# Patient Record
Sex: FEMALE | Race: WHITE | NOT HISPANIC OR LATINO | Employment: FULL TIME | URBAN - METROPOLITAN AREA
[De-identification: names, ages, dates, MRNs, and addresses within clinical notes are randomized per-mention and may not be internally consistent; named-entity substitution may affect disease eponyms.]

---

## 2017-02-08 ENCOUNTER — COMMUNICATION - HEALTHEAST (OUTPATIENT)
Dept: FAMILY MEDICINE | Facility: CLINIC | Age: 22
End: 2017-02-08

## 2017-02-08 DIAGNOSIS — L70.9 ACNE: ICD-10-CM

## 2017-02-14 ENCOUNTER — COMMUNICATION - HEALTHEAST (OUTPATIENT)
Dept: FAMILY MEDICINE | Facility: CLINIC | Age: 22
End: 2017-02-14

## 2017-02-14 DIAGNOSIS — F90.9 ATTENTION DEFICIT HYPERACTIVITY DISORDER (ADHD), UNSPECIFIED ADHD TYPE: ICD-10-CM

## 2017-05-14 ENCOUNTER — OFFICE VISIT - HEALTHEAST (OUTPATIENT)
Dept: FAMILY MEDICINE | Facility: CLINIC | Age: 22
End: 2017-05-14

## 2017-05-14 DIAGNOSIS — H61.22 IMPACTED CERUMEN OF LEFT EAR: ICD-10-CM

## 2017-05-18 ENCOUNTER — COMMUNICATION - HEALTHEAST (OUTPATIENT)
Dept: FAMILY MEDICINE | Facility: CLINIC | Age: 22
End: 2017-05-18

## 2017-05-18 DIAGNOSIS — F90.9 ATTENTION DEFICIT HYPERACTIVITY DISORDER (ADHD), UNSPECIFIED ADHD TYPE: ICD-10-CM

## 2017-05-23 ENCOUNTER — COMMUNICATION - HEALTHEAST (OUTPATIENT)
Dept: FAMILY MEDICINE | Facility: CLINIC | Age: 22
End: 2017-05-23

## 2017-06-04 ENCOUNTER — COMMUNICATION - HEALTHEAST (OUTPATIENT)
Dept: FAMILY MEDICINE | Facility: CLINIC | Age: 22
End: 2017-06-04

## 2017-06-04 DIAGNOSIS — K21.00 REFLUX ESOPHAGITIS: ICD-10-CM

## 2017-06-05 ENCOUNTER — RECORDS - HEALTHEAST (OUTPATIENT)
Dept: ADMINISTRATIVE | Facility: OTHER | Age: 22
End: 2017-06-05

## 2017-06-05 ENCOUNTER — OFFICE VISIT - HEALTHEAST (OUTPATIENT)
Dept: FAMILY MEDICINE | Facility: CLINIC | Age: 22
End: 2017-06-05

## 2017-06-05 DIAGNOSIS — F90.9 ATTENTION DEFICIT HYPERACTIVITY DISORDER (ADHD), UNSPECIFIED ADHD TYPE: ICD-10-CM

## 2017-06-05 ASSESSMENT — MIFFLIN-ST. JEOR: SCORE: 1411.32

## 2017-06-12 ENCOUNTER — AMBULATORY - HEALTHEAST (OUTPATIENT)
Dept: FAMILY MEDICINE | Facility: CLINIC | Age: 22
End: 2017-06-12

## 2017-08-17 ENCOUNTER — COMMUNICATION - HEALTHEAST (OUTPATIENT)
Dept: FAMILY MEDICINE | Facility: CLINIC | Age: 22
End: 2017-08-17

## 2017-08-17 DIAGNOSIS — F90.9 ATTENTION DEFICIT HYPERACTIVITY DISORDER (ADHD), UNSPECIFIED ADHD TYPE: ICD-10-CM

## 2017-11-29 ENCOUNTER — COMMUNICATION - HEALTHEAST (OUTPATIENT)
Dept: FAMILY MEDICINE | Facility: CLINIC | Age: 22
End: 2017-11-29

## 2017-11-29 DIAGNOSIS — F90.9 ATTENTION DEFICIT HYPERACTIVITY DISORDER (ADHD), UNSPECIFIED ADHD TYPE: ICD-10-CM

## 2017-12-18 ENCOUNTER — OFFICE VISIT - HEALTHEAST (OUTPATIENT)
Dept: FAMILY MEDICINE | Facility: CLINIC | Age: 22
End: 2017-12-18

## 2017-12-18 DIAGNOSIS — G47.00 INSOMNIA: ICD-10-CM

## 2017-12-18 DIAGNOSIS — F98.8 ATTENTION DEFICIT DISORDER: ICD-10-CM

## 2017-12-18 ASSESSMENT — MIFFLIN-ST. JEOR: SCORE: 1423.11

## 2017-12-27 ENCOUNTER — COMMUNICATION - HEALTHEAST (OUTPATIENT)
Dept: FAMILY MEDICINE | Facility: CLINIC | Age: 22
End: 2017-12-27

## 2017-12-27 DIAGNOSIS — F90.9 ATTENTION DEFICIT HYPERACTIVITY DISORDER (ADHD), UNSPECIFIED ADHD TYPE: ICD-10-CM

## 2018-01-14 ENCOUNTER — COMMUNICATION - HEALTHEAST (OUTPATIENT)
Dept: FAMILY MEDICINE | Facility: CLINIC | Age: 23
End: 2018-01-14

## 2018-01-14 DIAGNOSIS — L70.9 ACNE: ICD-10-CM

## 2018-02-07 ENCOUNTER — COMMUNICATION - HEALTHEAST (OUTPATIENT)
Dept: FAMILY MEDICINE | Facility: CLINIC | Age: 23
End: 2018-02-07

## 2018-02-07 ENCOUNTER — AMBULATORY - HEALTHEAST (OUTPATIENT)
Dept: FAMILY MEDICINE | Facility: CLINIC | Age: 23
End: 2018-02-07

## 2018-02-07 DIAGNOSIS — F90.9 ATTENTION DEFICIT HYPERACTIVITY DISORDER (ADHD), UNSPECIFIED ADHD TYPE: ICD-10-CM

## 2018-03-04 ENCOUNTER — COMMUNICATION - HEALTHEAST (OUTPATIENT)
Dept: FAMILY MEDICINE | Facility: CLINIC | Age: 23
End: 2018-03-04

## 2018-03-04 DIAGNOSIS — K21.00 REFLUX ESOPHAGITIS: ICD-10-CM

## 2018-05-07 ENCOUNTER — OFFICE VISIT - HEALTHEAST (OUTPATIENT)
Dept: FAMILY MEDICINE | Facility: CLINIC | Age: 23
End: 2018-05-07

## 2018-05-07 DIAGNOSIS — F41.1 ANXIETY STATE: ICD-10-CM

## 2018-05-07 DIAGNOSIS — G80.8 CONGENITAL DIPLEGIA (H): ICD-10-CM

## 2018-05-07 DIAGNOSIS — Z00.00 ROUTINE GENERAL MEDICAL EXAMINATION AT A HEALTH CARE FACILITY: ICD-10-CM

## 2018-05-07 DIAGNOSIS — F90.9 ATTENTION DEFICIT HYPERACTIVITY DISORDER (ADHD), UNSPECIFIED ADHD TYPE: ICD-10-CM

## 2018-05-07 ASSESSMENT — MIFFLIN-ST. JEOR: SCORE: 1415.86

## 2018-05-08 ENCOUNTER — COMMUNICATION - HEALTHEAST (OUTPATIENT)
Dept: FAMILY MEDICINE | Facility: CLINIC | Age: 23
End: 2018-05-08

## 2018-05-08 DIAGNOSIS — F90.9 ATTENTION DEFICIT HYPERACTIVITY DISORDER (ADHD), UNSPECIFIED ADHD TYPE: ICD-10-CM

## 2018-05-21 ENCOUNTER — COMMUNICATION - HEALTHEAST (OUTPATIENT)
Dept: FAMILY MEDICINE | Facility: CLINIC | Age: 23
End: 2018-05-21

## 2018-06-03 ENCOUNTER — COMMUNICATION - HEALTHEAST (OUTPATIENT)
Dept: FAMILY MEDICINE | Facility: CLINIC | Age: 23
End: 2018-06-03

## 2018-06-03 DIAGNOSIS — K21.00 REFLUX ESOPHAGITIS: ICD-10-CM

## 2018-08-01 ENCOUNTER — COMMUNICATION - HEALTHEAST (OUTPATIENT)
Dept: FAMILY MEDICINE | Facility: CLINIC | Age: 23
End: 2018-08-01

## 2018-08-01 DIAGNOSIS — F90.9 ATTENTION DEFICIT HYPERACTIVITY DISORDER (ADHD), UNSPECIFIED ADHD TYPE: ICD-10-CM

## 2018-08-06 ENCOUNTER — COMMUNICATION - HEALTHEAST (OUTPATIENT)
Dept: SCHEDULING | Facility: CLINIC | Age: 23
End: 2018-08-06

## 2018-08-13 ENCOUNTER — OFFICE VISIT - HEALTHEAST (OUTPATIENT)
Dept: FAMILY MEDICINE | Facility: CLINIC | Age: 23
End: 2018-08-13

## 2018-08-13 DIAGNOSIS — Z79.899 CONTROLLED SUBSTANCE AGREEMENT SIGNED: ICD-10-CM

## 2018-08-13 DIAGNOSIS — G80.8 CONGENITAL DIPLEGIA (H): ICD-10-CM

## 2018-08-13 DIAGNOSIS — F98.8 ATTENTION DEFICIT DISORDER: ICD-10-CM

## 2018-08-13 ASSESSMENT — MIFFLIN-ST. JEOR: SCORE: 1438.54

## 2018-08-15 ENCOUNTER — RECORDS - HEALTHEAST (OUTPATIENT)
Dept: ADMINISTRATIVE | Facility: OTHER | Age: 23
End: 2018-08-15

## 2018-10-25 ENCOUNTER — OFFICE VISIT - HEALTHEAST (OUTPATIENT)
Dept: FAMILY MEDICINE | Facility: CLINIC | Age: 23
End: 2018-10-25

## 2018-10-25 DIAGNOSIS — G80.8 CONGENITAL DIPLEGIA (H): ICD-10-CM

## 2018-12-18 ENCOUNTER — COMMUNICATION - HEALTHEAST (OUTPATIENT)
Dept: FAMILY MEDICINE | Facility: CLINIC | Age: 23
End: 2018-12-18

## 2018-12-18 DIAGNOSIS — L70.9 ACNE: ICD-10-CM

## 2019-01-09 ENCOUNTER — OFFICE VISIT - HEALTHEAST (OUTPATIENT)
Dept: FAMILY MEDICINE | Facility: CLINIC | Age: 24
End: 2019-01-09

## 2019-01-09 DIAGNOSIS — Z00.00 ROUTINE GENERAL MEDICAL EXAMINATION AT A HEALTH CARE FACILITY: ICD-10-CM

## 2019-01-09 DIAGNOSIS — F90.9 ATTENTION DEFICIT HYPERACTIVITY DISORDER (ADHD), UNSPECIFIED ADHD TYPE: ICD-10-CM

## 2019-01-09 DIAGNOSIS — F98.8 ATTENTION DEFICIT DISORDER, UNSPECIFIED HYPERACTIVITY PRESENCE: ICD-10-CM

## 2019-01-09 DIAGNOSIS — G80.8 CONGENITAL DIPLEGIA (H): ICD-10-CM

## 2019-01-09 ASSESSMENT — MIFFLIN-ST. JEOR: SCORE: 1438.54

## 2019-01-23 ENCOUNTER — COMMUNICATION - HEALTHEAST (OUTPATIENT)
Dept: FAMILY MEDICINE | Facility: CLINIC | Age: 24
End: 2019-01-23

## 2019-03-06 ENCOUNTER — COMMUNICATION - HEALTHEAST (OUTPATIENT)
Dept: FAMILY MEDICINE | Facility: CLINIC | Age: 24
End: 2019-03-06

## 2019-05-14 ENCOUNTER — OFFICE VISIT - HEALTHEAST (OUTPATIENT)
Dept: FAMILY MEDICINE | Facility: CLINIC | Age: 24
End: 2019-05-14

## 2019-05-14 ENCOUNTER — AMBULATORY - HEALTHEAST (OUTPATIENT)
Dept: FAMILY MEDICINE | Facility: CLINIC | Age: 24
End: 2019-05-14

## 2019-05-14 DIAGNOSIS — Z79.899 CONTROLLED SUBSTANCE AGREEMENT SIGNED: ICD-10-CM

## 2019-05-14 DIAGNOSIS — F98.8 ATTENTION DEFICIT DISORDER, UNSPECIFIED HYPERACTIVITY PRESENCE: ICD-10-CM

## 2019-05-14 DIAGNOSIS — F90.9 ATTENTION DEFICIT HYPERACTIVITY DISORDER (ADHD), UNSPECIFIED ADHD TYPE: ICD-10-CM

## 2019-05-14 LAB
AMPHETAMINES UR QL SCN: NORMAL
BARBITURATES UR QL: NORMAL
BENZODIAZ UR QL: NORMAL
CANNABINOIDS UR QL SCN: NORMAL
COCAINE UR QL: NORMAL
CREAT UR-MCNC: 89.1 MG/DL
METHADONE UR QL SCN: NORMAL
OPIATES UR QL SCN: NORMAL
OXYCODONE UR QL: NORMAL
PCP UR QL SCN: NORMAL

## 2019-05-14 ASSESSMENT — MIFFLIN-ST. JEOR: SCORE: 1415.86

## 2019-05-23 ENCOUNTER — COMMUNICATION - HEALTHEAST (OUTPATIENT)
Dept: FAMILY MEDICINE | Facility: CLINIC | Age: 24
End: 2019-05-23

## 2019-05-23 DIAGNOSIS — R52 BODY ACHES: ICD-10-CM

## 2019-06-02 ENCOUNTER — COMMUNICATION - HEALTHEAST (OUTPATIENT)
Dept: FAMILY MEDICINE | Facility: CLINIC | Age: 24
End: 2019-06-02

## 2019-06-02 DIAGNOSIS — K21.00 REFLUX ESOPHAGITIS: ICD-10-CM

## 2019-07-27 ENCOUNTER — COMMUNICATION - HEALTHEAST (OUTPATIENT)
Dept: FAMILY MEDICINE | Facility: CLINIC | Age: 24
End: 2019-07-27

## 2019-07-27 DIAGNOSIS — F90.9 ATTENTION DEFICIT HYPERACTIVITY DISORDER (ADHD), UNSPECIFIED ADHD TYPE: ICD-10-CM

## 2019-08-27 ENCOUNTER — COMMUNICATION - HEALTHEAST (OUTPATIENT)
Dept: FAMILY MEDICINE | Facility: CLINIC | Age: 24
End: 2019-08-27

## 2019-08-27 DIAGNOSIS — L70.9 ACNE: ICD-10-CM

## 2019-09-24 ENCOUNTER — COMMUNICATION - HEALTHEAST (OUTPATIENT)
Dept: FAMILY MEDICINE | Facility: CLINIC | Age: 24
End: 2019-09-24

## 2019-09-24 DIAGNOSIS — R52 BODY ACHES: ICD-10-CM

## 2019-10-11 ENCOUNTER — RECORDS - HEALTHEAST (OUTPATIENT)
Dept: ADMINISTRATIVE | Facility: OTHER | Age: 24
End: 2019-10-11

## 2019-10-12 ENCOUNTER — COMMUNICATION - HEALTHEAST (OUTPATIENT)
Dept: FAMILY MEDICINE | Facility: CLINIC | Age: 24
End: 2019-10-12

## 2019-10-12 DIAGNOSIS — F90.9 ATTENTION DEFICIT HYPERACTIVITY DISORDER (ADHD), UNSPECIFIED ADHD TYPE: ICD-10-CM

## 2019-10-25 ENCOUNTER — COMMUNICATION - HEALTHEAST (OUTPATIENT)
Dept: FAMILY MEDICINE | Facility: CLINIC | Age: 24
End: 2019-10-25

## 2019-11-01 ENCOUNTER — OFFICE VISIT - HEALTHEAST (OUTPATIENT)
Dept: FAMILY MEDICINE | Facility: CLINIC | Age: 24
End: 2019-11-01

## 2019-11-01 DIAGNOSIS — G80.8 CONGENITAL DIPLEGIA (H): ICD-10-CM

## 2019-11-01 DIAGNOSIS — F90.9 ATTENTION DEFICIT HYPERACTIVITY DISORDER (ADHD), UNSPECIFIED ADHD TYPE: ICD-10-CM

## 2019-11-01 DIAGNOSIS — Z23 NEED FOR VACCINATION: ICD-10-CM

## 2019-12-02 ENCOUNTER — RECORDS - HEALTHEAST (OUTPATIENT)
Dept: ADMINISTRATIVE | Facility: OTHER | Age: 24
End: 2019-12-02

## 2019-12-10 ENCOUNTER — COMMUNICATION - HEALTHEAST (OUTPATIENT)
Dept: FAMILY MEDICINE | Facility: CLINIC | Age: 24
End: 2019-12-10

## 2019-12-10 DIAGNOSIS — F90.9 ATTENTION DEFICIT HYPERACTIVITY DISORDER (ADHD), UNSPECIFIED ADHD TYPE: ICD-10-CM

## 2020-01-15 ENCOUNTER — COMMUNICATION - HEALTHEAST (OUTPATIENT)
Dept: FAMILY MEDICINE | Facility: CLINIC | Age: 25
End: 2020-01-15

## 2020-01-15 DIAGNOSIS — F90.9 ATTENTION DEFICIT HYPERACTIVITY DISORDER (ADHD), UNSPECIFIED ADHD TYPE: ICD-10-CM

## 2020-02-11 ENCOUNTER — COMMUNICATION - HEALTHEAST (OUTPATIENT)
Dept: FAMILY MEDICINE | Facility: CLINIC | Age: 25
End: 2020-02-11

## 2020-02-11 DIAGNOSIS — L70.9 ACNE: ICD-10-CM

## 2020-03-10 ENCOUNTER — OFFICE VISIT - HEALTHEAST (OUTPATIENT)
Dept: FAMILY MEDICINE | Facility: CLINIC | Age: 25
End: 2020-03-10

## 2020-03-10 DIAGNOSIS — Z79.899 CONTROLLED SUBSTANCE AGREEMENT SIGNED: ICD-10-CM

## 2020-03-10 DIAGNOSIS — J30.9 ALLERGIC RHINITIS, UNSPECIFIED SEASONALITY, UNSPECIFIED TRIGGER: ICD-10-CM

## 2020-03-10 DIAGNOSIS — G80.8 CONGENITAL DIPLEGIA (H): ICD-10-CM

## 2020-03-10 DIAGNOSIS — R52 BODY ACHES: ICD-10-CM

## 2020-03-10 DIAGNOSIS — F90.9 ATTENTION DEFICIT HYPERACTIVITY DISORDER (ADHD), UNSPECIFIED ADHD TYPE: ICD-10-CM

## 2020-03-10 DIAGNOSIS — R51.9 NONINTRACTABLE HEADACHE, UNSPECIFIED CHRONICITY PATTERN, UNSPECIFIED HEADACHE TYPE: ICD-10-CM

## 2020-03-10 DIAGNOSIS — R19.4 CHANGE IN BOWEL HABIT: ICD-10-CM

## 2020-03-10 LAB
ALBUMIN SERPL-MCNC: 4.4 G/DL (ref 3.5–5)
ALP SERPL-CCNC: 66 U/L (ref 45–120)
ALT SERPL W P-5'-P-CCNC: 25 U/L (ref 0–45)
AMPHETAMINES UR QL SCN: NORMAL
ANION GAP SERPL CALCULATED.3IONS-SCNC: 11 MMOL/L (ref 5–18)
AST SERPL W P-5'-P-CCNC: 18 U/L (ref 0–40)
BARBITURATES UR QL: NORMAL
BENZODIAZ UR QL: NORMAL
BILIRUB SERPL-MCNC: 0.6 MG/DL (ref 0–1)
BUN SERPL-MCNC: 8 MG/DL (ref 8–22)
CALCIUM SERPL-MCNC: 9.9 MG/DL (ref 8.5–10.5)
CANNABINOIDS UR QL SCN: NORMAL
CHLORIDE BLD-SCNC: 104 MMOL/L (ref 98–107)
CO2 SERPL-SCNC: 23 MMOL/L (ref 22–31)
COCAINE UR QL: NORMAL
CREAT SERPL-MCNC: 0.72 MG/DL (ref 0.6–1.1)
CREAT UR-MCNC: 69.1 MG/DL
ERYTHROCYTE [DISTWIDTH] IN BLOOD BY AUTOMATED COUNT: 12 % (ref 11–14.5)
ERYTHROCYTE [SEDIMENTATION RATE] IN BLOOD BY WESTERGREN METHOD: 5 MM/HR (ref 0–20)
GFR SERPL CREATININE-BSD FRML MDRD: >60 ML/MIN/1.73M2
GLUCOSE BLD-MCNC: 83 MG/DL (ref 70–125)
HCT VFR BLD AUTO: 41.5 % (ref 35–47)
HGB BLD-MCNC: 13.8 G/DL (ref 12–16)
MCH RBC QN AUTO: 30 PG (ref 27–34)
MCHC RBC AUTO-ENTMCNC: 33.4 G/DL (ref 32–36)
MCV RBC AUTO: 90 FL (ref 80–100)
METHADONE UR QL SCN: NORMAL
OPIATES UR QL SCN: NORMAL
OXYCODONE UR QL: NORMAL
PCP UR QL SCN: NORMAL
PLATELET # BLD AUTO: 302 THOU/UL (ref 140–440)
PMV BLD AUTO: 7.2 FL (ref 7–10)
POTASSIUM BLD-SCNC: 4.7 MMOL/L (ref 3.5–5)
PROT SERPL-MCNC: 7.3 G/DL (ref 6–8)
RBC # BLD AUTO: 4.61 MILL/UL (ref 3.8–5.4)
SODIUM SERPL-SCNC: 138 MMOL/L (ref 136–145)
WBC: 4.8 THOU/UL (ref 4–11)

## 2020-03-10 ASSESSMENT — MIFFLIN-ST. JEOR: SCORE: 1419.48

## 2020-04-07 ENCOUNTER — COMMUNICATION - HEALTHEAST (OUTPATIENT)
Dept: SCHEDULING | Facility: CLINIC | Age: 25
End: 2020-04-07

## 2020-04-07 ENCOUNTER — OFFICE VISIT - HEALTHEAST (OUTPATIENT)
Dept: FAMILY MEDICINE | Facility: CLINIC | Age: 25
End: 2020-04-07

## 2020-04-07 DIAGNOSIS — J34.89 SINUS PAIN: ICD-10-CM

## 2020-04-07 DIAGNOSIS — H92.03 EAR PAIN, BILATERAL: ICD-10-CM

## 2020-04-11 ENCOUNTER — COMMUNICATION - HEALTHEAST (OUTPATIENT)
Dept: FAMILY MEDICINE | Facility: CLINIC | Age: 25
End: 2020-04-11

## 2020-04-11 DIAGNOSIS — F90.9 ATTENTION DEFICIT HYPERACTIVITY DISORDER (ADHD), UNSPECIFIED ADHD TYPE: ICD-10-CM

## 2020-05-15 ENCOUNTER — COMMUNICATION - HEALTHEAST (OUTPATIENT)
Dept: FAMILY MEDICINE | Facility: CLINIC | Age: 25
End: 2020-05-15

## 2020-05-15 ENCOUNTER — OFFICE VISIT - HEALTHEAST (OUTPATIENT)
Dept: FAMILY MEDICINE | Facility: CLINIC | Age: 25
End: 2020-05-15

## 2020-05-15 DIAGNOSIS — F90.9 ATTENTION DEFICIT HYPERACTIVITY DISORDER (ADHD), UNSPECIFIED ADHD TYPE: ICD-10-CM

## 2020-05-15 DIAGNOSIS — M70.61 TROCHANTERIC BURSITIS OF BOTH HIPS: ICD-10-CM

## 2020-05-15 DIAGNOSIS — M70.62 TROCHANTERIC BURSITIS OF BOTH HIPS: ICD-10-CM

## 2020-05-15 DIAGNOSIS — G80.8 CONGENITAL DIPLEGIA (H): ICD-10-CM

## 2020-05-21 ENCOUNTER — OFFICE VISIT - HEALTHEAST (OUTPATIENT)
Dept: PHYSICAL THERAPY | Facility: REHABILITATION | Age: 25
End: 2020-05-21

## 2020-05-21 DIAGNOSIS — M70.61 TROCHANTERIC BURSITIS OF BOTH HIPS: ICD-10-CM

## 2020-05-21 DIAGNOSIS — M70.62 TROCHANTERIC BURSITIS OF BOTH HIPS: ICD-10-CM

## 2020-05-21 DIAGNOSIS — M54.50 CHRONIC BILATERAL LOW BACK PAIN WITHOUT SCIATICA: ICD-10-CM

## 2020-05-21 DIAGNOSIS — G89.29 CHRONIC BILATERAL LOW BACK PAIN WITHOUT SCIATICA: ICD-10-CM

## 2020-05-26 ENCOUNTER — OFFICE VISIT - HEALTHEAST (OUTPATIENT)
Dept: PHYSICAL THERAPY | Facility: REHABILITATION | Age: 25
End: 2020-05-26

## 2020-05-26 DIAGNOSIS — M70.62 TROCHANTERIC BURSITIS OF BOTH HIPS: ICD-10-CM

## 2020-05-26 DIAGNOSIS — G89.29 CHRONIC BILATERAL LOW BACK PAIN WITHOUT SCIATICA: ICD-10-CM

## 2020-05-26 DIAGNOSIS — M70.61 TROCHANTERIC BURSITIS OF BOTH HIPS: ICD-10-CM

## 2020-05-26 DIAGNOSIS — M54.50 CHRONIC BILATERAL LOW BACK PAIN WITHOUT SCIATICA: ICD-10-CM

## 2020-05-27 ENCOUNTER — COMMUNICATION - HEALTHEAST (OUTPATIENT)
Dept: FAMILY MEDICINE | Facility: CLINIC | Age: 25
End: 2020-05-27

## 2020-05-27 DIAGNOSIS — K21.00 REFLUX ESOPHAGITIS: ICD-10-CM

## 2020-06-10 ENCOUNTER — OFFICE VISIT - HEALTHEAST (OUTPATIENT)
Dept: PHYSICAL THERAPY | Facility: REHABILITATION | Age: 25
End: 2020-06-10

## 2020-06-10 DIAGNOSIS — M70.61 TROCHANTERIC BURSITIS OF BOTH HIPS: ICD-10-CM

## 2020-06-10 DIAGNOSIS — G89.29 CHRONIC BILATERAL LOW BACK PAIN WITHOUT SCIATICA: ICD-10-CM

## 2020-06-10 DIAGNOSIS — M54.50 CHRONIC BILATERAL LOW BACK PAIN WITHOUT SCIATICA: ICD-10-CM

## 2020-06-10 DIAGNOSIS — M70.62 TROCHANTERIC BURSITIS OF BOTH HIPS: ICD-10-CM

## 2020-06-18 ENCOUNTER — COMMUNICATION - HEALTHEAST (OUTPATIENT)
Dept: FAMILY MEDICINE | Facility: CLINIC | Age: 25
End: 2020-06-18

## 2020-06-18 DIAGNOSIS — F90.9 ATTENTION DEFICIT HYPERACTIVITY DISORDER (ADHD), UNSPECIFIED ADHD TYPE: ICD-10-CM

## 2020-06-19 ENCOUNTER — OFFICE VISIT - HEALTHEAST (OUTPATIENT)
Dept: PHYSICAL THERAPY | Facility: REHABILITATION | Age: 25
End: 2020-06-19

## 2020-06-19 DIAGNOSIS — M70.62 TROCHANTERIC BURSITIS OF BOTH HIPS: ICD-10-CM

## 2020-06-19 DIAGNOSIS — G89.29 CHRONIC BILATERAL LOW BACK PAIN WITHOUT SCIATICA: ICD-10-CM

## 2020-06-19 DIAGNOSIS — M54.50 CHRONIC BILATERAL LOW BACK PAIN WITHOUT SCIATICA: ICD-10-CM

## 2020-06-19 DIAGNOSIS — M70.61 TROCHANTERIC BURSITIS OF BOTH HIPS: ICD-10-CM

## 2020-06-24 ENCOUNTER — OFFICE VISIT - HEALTHEAST (OUTPATIENT)
Dept: PHYSICAL THERAPY | Facility: REHABILITATION | Age: 25
End: 2020-06-24

## 2020-06-24 DIAGNOSIS — G89.29 CHRONIC BILATERAL LOW BACK PAIN WITHOUT SCIATICA: ICD-10-CM

## 2020-06-24 DIAGNOSIS — M70.61 TROCHANTERIC BURSITIS OF BOTH HIPS: ICD-10-CM

## 2020-06-24 DIAGNOSIS — M70.62 TROCHANTERIC BURSITIS OF BOTH HIPS: ICD-10-CM

## 2020-06-24 DIAGNOSIS — M54.50 CHRONIC BILATERAL LOW BACK PAIN WITHOUT SCIATICA: ICD-10-CM

## 2020-06-30 ENCOUNTER — OFFICE VISIT - HEALTHEAST (OUTPATIENT)
Dept: FAMILY MEDICINE | Facility: CLINIC | Age: 25
End: 2020-06-30

## 2020-06-30 DIAGNOSIS — M70.62 TROCHANTERIC BURSITIS OF BOTH HIPS: ICD-10-CM

## 2020-06-30 DIAGNOSIS — M70.61 TROCHANTERIC BURSITIS OF BOTH HIPS: ICD-10-CM

## 2020-06-30 DIAGNOSIS — K21.00 REFLUX ESOPHAGITIS: ICD-10-CM

## 2020-07-01 ENCOUNTER — OFFICE VISIT - HEALTHEAST (OUTPATIENT)
Dept: PHYSICAL THERAPY | Facility: REHABILITATION | Age: 25
End: 2020-07-01

## 2020-07-01 DIAGNOSIS — M70.62 TROCHANTERIC BURSITIS OF BOTH HIPS: ICD-10-CM

## 2020-07-01 DIAGNOSIS — G89.29 CHRONIC BILATERAL LOW BACK PAIN WITHOUT SCIATICA: ICD-10-CM

## 2020-07-01 DIAGNOSIS — M54.50 CHRONIC BILATERAL LOW BACK PAIN WITHOUT SCIATICA: ICD-10-CM

## 2020-07-01 DIAGNOSIS — M70.61 TROCHANTERIC BURSITIS OF BOTH HIPS: ICD-10-CM

## 2020-07-03 ENCOUNTER — AMBULATORY - HEALTHEAST (OUTPATIENT)
Dept: FAMILY MEDICINE | Facility: CLINIC | Age: 25
End: 2020-07-03

## 2020-07-03 DIAGNOSIS — M70.61 TROCHANTERIC BURSITIS OF BOTH HIPS: ICD-10-CM

## 2020-07-03 DIAGNOSIS — M70.62 TROCHANTERIC BURSITIS OF BOTH HIPS: ICD-10-CM

## 2020-07-03 DIAGNOSIS — G80.8 CONGENITAL DIPLEGIA (H): ICD-10-CM

## 2020-07-16 ENCOUNTER — COMMUNICATION - HEALTHEAST (OUTPATIENT)
Dept: FAMILY MEDICINE | Facility: CLINIC | Age: 25
End: 2020-07-16

## 2020-07-20 ENCOUNTER — COMMUNICATION - HEALTHEAST (OUTPATIENT)
Dept: FAMILY MEDICINE | Facility: CLINIC | Age: 25
End: 2020-07-20

## 2020-07-20 DIAGNOSIS — F90.9 ATTENTION DEFICIT HYPERACTIVITY DISORDER (ADHD), UNSPECIFIED ADHD TYPE: ICD-10-CM

## 2020-08-04 ENCOUNTER — COMMUNICATION - HEALTHEAST (OUTPATIENT)
Dept: FAMILY MEDICINE | Facility: CLINIC | Age: 25
End: 2020-08-04

## 2020-08-04 ENCOUNTER — COMMUNICATION - HEALTHEAST (OUTPATIENT)
Dept: SCHEDULING | Facility: CLINIC | Age: 25
End: 2020-08-04

## 2020-08-04 ENCOUNTER — OFFICE VISIT - HEALTHEAST (OUTPATIENT)
Dept: FAMILY MEDICINE | Facility: CLINIC | Age: 25
End: 2020-08-04

## 2020-08-04 DIAGNOSIS — M70.61 TROCHANTERIC BURSITIS OF RIGHT HIP: ICD-10-CM

## 2020-08-04 DIAGNOSIS — J32.9 CHRONIC SINUSITIS, UNSPECIFIED LOCATION: ICD-10-CM

## 2020-08-04 DIAGNOSIS — M70.62 TROCHANTERIC BURSITIS OF LEFT HIP: ICD-10-CM

## 2020-08-04 DIAGNOSIS — F98.8 ATTENTION DEFICIT DISORDER, UNSPECIFIED HYPERACTIVITY PRESENCE: ICD-10-CM

## 2020-08-06 ENCOUNTER — COMMUNICATION - HEALTHEAST (OUTPATIENT)
Dept: FAMILY MEDICINE | Facility: CLINIC | Age: 25
End: 2020-08-06

## 2020-08-15 ENCOUNTER — COMMUNICATION - HEALTHEAST (OUTPATIENT)
Dept: FAMILY MEDICINE | Facility: CLINIC | Age: 25
End: 2020-08-15

## 2020-08-18 ENCOUNTER — TRANSCRIBE ORDERS (OUTPATIENT)
Dept: OTHER | Age: 25
End: 2020-08-18

## 2020-08-18 DIAGNOSIS — G80.1 CEREBRAL PALSY WITH SPASTIC DIPLEGIA (H): Primary | ICD-10-CM

## 2020-08-24 ENCOUNTER — COMMUNICATION - HEALTHEAST (OUTPATIENT)
Dept: FAMILY MEDICINE | Facility: CLINIC | Age: 25
End: 2020-08-24

## 2020-08-24 DIAGNOSIS — F90.9 ATTENTION DEFICIT HYPERACTIVITY DISORDER (ADHD), UNSPECIFIED ADHD TYPE: ICD-10-CM

## 2020-08-31 ENCOUNTER — TELEPHONE (OUTPATIENT)
Dept: PHYSICAL MEDICINE AND REHAB | Facility: CLINIC | Age: 25
End: 2020-08-31

## 2020-08-31 NOTE — TELEPHONE ENCOUNTER
[facility] Call Center    Phone Message    May a detailed message be left on voicemail: yes     Reason for Call: Other: Anjelica is requesting an email regarding her apointment for 9/2/20. She would like the appointment letter and map be sent to her email at [email or web address]    Action Taken: Message routed to:  [facility]    Travel Screening: Not Applicable

## 2020-09-01 NOTE — TELEPHONE ENCOUNTER
Per patient's request, the 9/2/20 appointment letter with Dr Mckay  was emailed to her along with map.

## 2020-09-02 ENCOUNTER — OFFICE VISIT (OUTPATIENT)
Dept: PHYSICAL MEDICINE AND REHAB | Facility: CLINIC | Age: 25
End: 2020-09-02
Attending: FAMILY MEDICINE

## 2020-09-02 VITALS — HEART RATE: 105 BPM | OXYGEN SATURATION: 99 % | DIASTOLIC BLOOD PRESSURE: 82 MMHG | SYSTOLIC BLOOD PRESSURE: 115 MMHG

## 2020-09-02 DIAGNOSIS — M25.552 HIP PAIN, LEFT: ICD-10-CM

## 2020-09-02 DIAGNOSIS — G80.1 SPASTIC DIPLEGIC CEREBRAL PALSY (H): ICD-10-CM

## 2020-09-02 DIAGNOSIS — M25.551 HIP PAIN, RIGHT: ICD-10-CM

## 2020-09-02 DIAGNOSIS — M70.61 GREATER TROCHANTERIC BURSITIS OF BOTH HIPS: Primary | ICD-10-CM

## 2020-09-02 DIAGNOSIS — M70.62 GREATER TROCHANTERIC BURSITIS OF BOTH HIPS: Primary | ICD-10-CM

## 2020-09-02 RX ORDER — FAMOTIDINE 40 MG/1
40 TABLET, FILM COATED ORAL EVERY EVENING
COMMUNITY
Start: 2020-06-30 | End: 2021-08-02

## 2020-09-02 RX ORDER — PEDI MULTIVIT NO.25/FOLIC ACID 300 MCG
1 TABLET,CHEWABLE ORAL DAILY
COMMUNITY

## 2020-09-02 RX ORDER — SUMATRIPTAN 25 MG/1
25 TABLET, FILM COATED ORAL
COMMUNITY
Start: 2020-03-10 | End: 2022-10-11 | Stop reason: ALTCHOICE

## 2020-09-02 RX ORDER — NORGESTIMATE AND ETHINYL ESTRADIOL 0.25-0.035
1 KIT ORAL DAILY
COMMUNITY
Start: 2020-02-15 | End: 2021-09-22

## 2020-09-02 RX ORDER — GABAPENTIN 100 MG/1
1-4 CAPSULE ORAL
COMMUNITY
Start: 2020-03-10 | End: 2023-04-27

## 2020-09-02 RX ORDER — FLUTICASONE PROPIONATE 50 MCG
2 SPRAY, SUSPENSION (ML) NASAL DAILY
COMMUNITY
Start: 2020-03-10 | End: 2021-08-23

## 2020-09-02 RX ORDER — DEXTROAMPHETAMINE SACCHARATE, AMPHETAMINE ASPARTATE MONOHYDRATE, DEXTROAMPHETAMINE SULFATE AND AMPHETAMINE SULFATE 5; 5; 5; 5 MG/1; MG/1; MG/1; MG/1
20 CAPSULE, EXTENDED RELEASE ORAL EVERY MORNING
COMMUNITY
Start: 2020-08-27 | End: 2021-07-11

## 2020-09-02 RX ORDER — IBUPROFEN 600 MG/1
600 TABLET, FILM COATED ORAL EVERY 8 HOURS PRN
COMMUNITY
Start: 2020-03-10 | End: 2021-09-03

## 2020-09-02 RX ORDER — CETIRIZINE HYDROCHLORIDE 10 MG/1
10 TABLET ORAL DAILY
COMMUNITY

## 2020-09-02 ASSESSMENT — PAIN SCALES - GENERAL: PAINLEVEL: MILD PAIN (3)

## 2020-09-02 NOTE — PROGRESS NOTES
"[facility] - New Patient Note   Anjelica Ramirez  [id number]  Date of Evaluation: 9/2/2020  Referring Physician: Dr. Lacey Gabriel  Reason for consult: Hip pain  HPI:  Anjelica Ramirez is a 24 y/o F with a past medical history of spastic diplegic cerebral palsy who presents to the rehabilitation clinic today for evaluation of hip pain.  She says this is present in both hips, left greater than right and has difficulty with driving and lying on the left side.  She says she will sometimes sleep in a chair because of the pain.  Pain can feel better for a few days at a time but then comes back and she has previously undergone steroid injections for greater trochanteric bursitis which helped for about 2 days only.  She is also previously been on a steroid taper and ibuprofen, however developed stomach issues from the NSAID.  She is participating in physical therapy and says it can \"cap it at a certain level\".  She does do a stretching regimen at home and can reach a 10 out of 10, however with exercises is generally at about a 4.    In addition to her cerebral palsy she says she has a tremor and poor motor control of her extremities.  She has cramping in both legs almost constantly and has developed low back pain as well.  She has a left shoe lift for a leg length discrepancy and was previously followed at [facility] over a year ago and then discharged from them.  She says her ambulation is \"functional\" but has been told she is \"visibly different\".  She does not use any bracing or assistive devices but endorses poor endurance.  She is independent for all ADLs, and feels that her legs are tight but has never been on any medications for spasticity.    PMH:  Past Medical History:   Diagnosis Date     History of Tonsillectomy        PSH:  No past surgical history on file.    Allergies:  Allergies   Allergen Reactions     Nkda [No Known Drug Allergies]        Medications:  No current " outpatient medications on file.     No current facility-administered medications for this visit.        Social History:  Social History     Socioeconomic History     Marital status: Single     Spouse name: Not on file     Number of children: Not on file     Years of education: Not on file     Highest education level: Not on file   Occupational History     Not on file   Social Needs     Financial resource strain: Not on file     Food insecurity     Worry: Not on file     Inability: Not on file     Transportation needs     Medical: Not on file     Non-medical: Not on file   Tobacco Use     Smoking status: Not on file   Substance and Sexual Activity     Alcohol use: Not on file     Drug use: Not on file     Sexual activity: Not on file   Lifestyle     Physical activity     Days per week: Not on file     Minutes per session: Not on file     Stress: Not on file   Relationships     Social connections     Talks on phone: Not on file     Gets together: Not on file     Attends Zoroastrian service: Not on file     Active member of club or organization: Not on file     Attends meetings of clubs or organizations: Not on file     Relationship status: Not on file     Intimate partner violence     Fear of current or ex partner: Not on file     Emotionally abused: Not on file     Physically abused: Not on file     Forced sexual activity: Not on file   Other Topics Concern     Not on file   Social History Narrative     Not on file     Review of Systems - History obtained from chart review and the patient  General ROS: negative for - chills or fever  Respiratory ROS: no cough, shortness of breath, or wheezing  Cardiovascular ROS: no chest pain or dyspnea on exertion  Musculoskeletal ROS: positive for - gait disturbance and joint stiffness, hip pain  Neurological ROS: Cerebral palsy  Functional Hx:  As per HPI    Physical Exam:  /82 (BP Location: Right arm, Patient Position: Sitting, Cuff Size: Adult Regular)   Pulse 105   SpO2  99%     Gen: NAD  Skin: No rashes noted  HEENT: NC/AT  Pulm: Non-labored breathing  GI: Soft, NT/ND  Ext: No LE Edema, no calf tenderness.  1/4 to 1/2 inch leg length discrepancy (L shorter than R).  +TTP over the b/l greater trochanters.  Neuro: AAOx3, follows commands, answers appropriately  Negative FABERs and FAIR.  Negative SLR.  Assessment:  Anjelica Ramirez is a 24 y/o F with a past medical history of spastic diplegic cerebral palsy who presents to the rehabilitation clinic today for evaluation of hip pain, L>R.   Recommendations:  -Upon examination, it does appear that Anjelica's hip pain is consistent with greater trochanteric bursitis.  She has previously tried blind injections with 2 days of relief, steroids, and NSAIDs.  I would recommend repeating steroid injections when she is due but with ultrasound guidance to ensure appropriate placement of the medication.  She will be moving up to [place] shortly, and I do anticipate she could find a provider who is able to do this for her.  Otherwise I would be happy to refer her to a provider locally who can perform it.  -I explained to her that long term management will be stretching of her hamstrings and ilio-tibial band.  I demonstrated some basic exercises she can do at home, and should also continue with PT who can further work on exercises for her.  -Will place a new order for a left shoe lift for her leg length discrepency  -May return to see me on an as needed basis only.  Raz Wheeler MD  Department of Rehabilitation Medicine  Pager: [phone]  Time Spent on this Encounter   I, Raz Wheeler, spent a total of 45 minutes face-to-face or managing the care of Anjelica Ramirez. Over 50% of my time was spent counseling the patient and coordinating care. See note for details.

## 2020-09-23 ENCOUNTER — COMMUNICATION - HEALTHEAST (OUTPATIENT)
Dept: FAMILY MEDICINE | Facility: CLINIC | Age: 25
End: 2020-09-23

## 2020-09-23 DIAGNOSIS — R52 BODY ACHES: ICD-10-CM

## 2020-09-25 ENCOUNTER — RECORDS - HEALTHEAST (OUTPATIENT)
Dept: ADMINISTRATIVE | Facility: OTHER | Age: 25
End: 2020-09-25

## 2020-09-28 ENCOUNTER — COMMUNICATION - HEALTHEAST (OUTPATIENT)
Dept: FAMILY MEDICINE | Facility: CLINIC | Age: 25
End: 2020-09-28

## 2020-09-28 DIAGNOSIS — F90.9 ATTENTION DEFICIT HYPERACTIVITY DISORDER (ADHD), UNSPECIFIED ADHD TYPE: ICD-10-CM

## 2020-10-05 ENCOUNTER — COMMUNICATION - HEALTHEAST (OUTPATIENT)
Dept: FAMILY MEDICINE | Facility: CLINIC | Age: 25
End: 2020-10-05

## 2020-10-21 ENCOUNTER — COMMUNICATION - HEALTHEAST (OUTPATIENT)
Dept: FAMILY MEDICINE | Facility: CLINIC | Age: 25
End: 2020-10-21

## 2020-10-21 DIAGNOSIS — L70.9 ACNE: ICD-10-CM

## 2020-11-16 ENCOUNTER — COMMUNICATION - HEALTHEAST (OUTPATIENT)
Dept: FAMILY MEDICINE | Facility: CLINIC | Age: 25
End: 2020-11-16

## 2020-11-16 DIAGNOSIS — F90.9 ATTENTION DEFICIT HYPERACTIVITY DISORDER (ADHD), UNSPECIFIED ADHD TYPE: ICD-10-CM

## 2020-12-13 ENCOUNTER — HEALTH MAINTENANCE LETTER (OUTPATIENT)
Age: 25
End: 2020-12-13

## 2020-12-21 ENCOUNTER — COMMUNICATION - HEALTHEAST (OUTPATIENT)
Dept: FAMILY MEDICINE | Facility: CLINIC | Age: 25
End: 2020-12-21

## 2020-12-21 DIAGNOSIS — F90.9 ATTENTION DEFICIT HYPERACTIVITY DISORDER (ADHD), UNSPECIFIED ADHD TYPE: ICD-10-CM

## 2020-12-22 ENCOUNTER — COMMUNICATION - HEALTHEAST (OUTPATIENT)
Dept: FAMILY MEDICINE | Facility: CLINIC | Age: 25
End: 2020-12-22

## 2020-12-22 DIAGNOSIS — M70.61 TROCHANTERIC BURSITIS OF RIGHT HIP: ICD-10-CM

## 2020-12-22 DIAGNOSIS — M70.62 TROCHANTERIC BURSITIS OF LEFT HIP: ICD-10-CM

## 2020-12-23 ENCOUNTER — COMMUNICATION - HEALTHEAST (OUTPATIENT)
Dept: FAMILY MEDICINE | Facility: CLINIC | Age: 25
End: 2020-12-23

## 2020-12-23 ENCOUNTER — RECORDS - HEALTHEAST (OUTPATIENT)
Dept: ADMINISTRATIVE | Facility: OTHER | Age: 25
End: 2020-12-23

## 2020-12-23 DIAGNOSIS — F90.9 ATTENTION DEFICIT HYPERACTIVITY DISORDER (ADHD), UNSPECIFIED ADHD TYPE: ICD-10-CM

## 2020-12-28 ENCOUNTER — COMMUNICATION - HEALTHEAST (OUTPATIENT)
Dept: PHYSICAL MEDICINE AND REHAB | Facility: CLINIC | Age: 25
End: 2020-12-28

## 2021-01-19 ENCOUNTER — RECORDS - HEALTHEAST (OUTPATIENT)
Dept: ADMINISTRATIVE | Facility: OTHER | Age: 26
End: 2021-01-19

## 2021-02-15 ENCOUNTER — COMMUNICATION - HEALTHEAST (OUTPATIENT)
Dept: FAMILY MEDICINE | Facility: CLINIC | Age: 26
End: 2021-02-15

## 2021-02-15 DIAGNOSIS — J30.9 ALLERGIC RHINITIS, UNSPECIFIED SEASONALITY, UNSPECIFIED TRIGGER: ICD-10-CM

## 2021-05-05 ENCOUNTER — COMMUNICATION - HEALTHEAST (OUTPATIENT)
Dept: FAMILY MEDICINE | Facility: CLINIC | Age: 26
End: 2021-05-05

## 2021-05-05 DIAGNOSIS — F90.9 ATTENTION DEFICIT HYPERACTIVITY DISORDER (ADHD), UNSPECIFIED ADHD TYPE: ICD-10-CM

## 2021-05-27 ENCOUNTER — COMMUNICATION - HEALTHEAST (OUTPATIENT)
Dept: FAMILY MEDICINE | Facility: CLINIC | Age: 26
End: 2021-05-27

## 2021-05-27 VITALS — TEMPERATURE: 99.8 F

## 2021-05-27 DIAGNOSIS — K21.00 REFLUX ESOPHAGITIS: ICD-10-CM

## 2021-05-28 NOTE — PROGRESS NOTES
"1. Attention deficit disorder, unspecified hyperactivity presence  Drug Abuse 1+, Urine   2. Controlled substance agreement signed  Drug Abuse 1+, Urine         ASSESSMENT/PLAN:     Body Mass Index was not assessed due to normal.    1. Attention deficit disorder, unspecified hyperactivity presence    - Drug Abuse 1+, Urine    2. Controlled substance agreement signed    - Drug Abuse 1+, Urine      There are no Patient Instructions on file for this visit.  There are no discontinued medications.  Return in about 6 months (around 11/14/2019) for Controlled substance use, ADHD.        Anais Sheikh NP          SUBJECTIVE:  Anjelica Ramirez is a 23 y.o. female who presents for her ADHD follow-up.  This is a patient of Dr. Alanna Oliva's who is currently on a medical leave.  Patient diagnosed in fifth grade at age 13 with ADHD.  She has been taking Adderall 20 mg extended release daily.  She does notice that her irritability and short temper are more persistent the more she takes the medication.  On the weekend, she will sometimes skip a dose so she is not so irritable and she is not typically doing homework at that time.  She is using the medication strictly for college and to complete assignments on time.  She does have a special extension in her classes for finishing tests and assignments as she is not able to physically right \"fast enough to get everything done\".  She does have some loss of appetite on the medication, she has lost 5 pounds since January.  She no longer struggles with insomnia.  She was seeing a counselor at one point for her insomnia issues and completed therapy for that after a few sessions.  She does not drink alcohol, she is a non-smoker and she denies any illicit drug use.  She has been able to complete all tasks and assignments at school with the use of the Adderall medication.  She has tried many different medications prior to starting the Adderall which were non-stimulant but she felt they " were not as effective.  She is now working full-time at the [employer] working the [employer].  We did renew her controlled substance agreement today we discussed all the content in the agreement, she did complete the random urine drug screen today.  Review of  shows compliance with medication use.  VSS.   Chief Complaint   Patient presents with     Follow-up     Med Check -          Patient Active Problem List   Diagnosis     Anxiety     Chronic Reflux Esophagitis     Allergies     Chronic Rhinitis     Acne     Joint Pain, Localized In The Knee     ADHD, Predominantly Inattentive Type     Diplegic Cerebral Palsy With Spasticity     Sleep Disturbances     Fatigue     Memory Lapses Or Loss     Abnormal involuntary movement     Allergic Rhinitis     Headache     Controlled substance agreement signed       Current Outpatient Medications   Medication Sig Dispense Refill     cetirizine (ZYRTEC) 10 MG tablet Take 10 mg by mouth daily.       dextroamphetamine-amphetamine (ADDERALL XR) 20 MG 24 hr capsule Take 1 capsule (20 mg total) by mouth every morning. 90 capsule 0     esomeprazole (NEXIUM) 40 MG capsule TAKE 1 CAPSULE EVERY MORNING BEFORE BREAKFAST 90 capsule 3     ESTARYLLA 0.25-35 mg-mcg per tablet TAKE 1 TABLET DAILY 84 tablet 2     fluticasone (FLONASE) 50 mcg/actuation nasal spray INSTILL ONE SPRAY INTO BOTH NOSTRILS ONCE DAILY FOR 30 DAYS.  0     gabapentin (NEURONTIN) 100 MG capsule Take 1-4 caps by mouth before bedtime. 360 capsule 0      mg tablet TAKE 1 TABLET DAILY WITH FOOD AS NEEDED FOR PAIN 60 tablet 1     MULTIVIT &MINERALS/FERROUS FUM (MULTI VITAMIN ORAL) Take by mouth.       SUMAtriptan (IMITREX) 25 MG tablet Take 1 tablet (25 mg total) by mouth every 2 (two) hours as needed for migraine. 10 tablet 3     No current facility-administered medications for this visit.        Social History     Tobacco Use   Smoking Status Never Smoker   Smokeless Tobacco Never Used   Tobacco Comment    no exposure         REVIEW OF SYSTEMS: Denies poor attention to detail, difficulty sustaining attention, poor listening, loses work, poor task completion, poor organization, avoids tasks, easily distracted, forgetful      TOBACCO USE:  Social History     Tobacco Use   Smoking Status Never Smoker   Smokeless Tobacco Never Used   Tobacco Comment    no exposure      Social History     Socioeconomic History     Marital status: Single     Spouse name: Not on file     Number of children: Not on file     Years of education: Not on file     Highest education level: Not on file   Occupational History     Not on file   Social Needs     Financial resource strain: Not on file     Food insecurity:     Worry: Not on file     Inability: Not on file     Transportation needs:     Medical: Not on file     Non-medical: Not on file   Tobacco Use     Smoking status: Never Smoker     Smokeless tobacco: Never Used     Tobacco comment: no exposure    Substance and Sexual Activity     Alcohol use: No     Drug use: No     Sexual activity: Never   Lifestyle     Physical activity:     Days per week: Not on file     Minutes per session: Not on file     Stress: Not on file   Relationships     Social connections:     Talks on phone: Not on file     Gets together: Not on file     Attends Synagogue service: Not on file     Active member of club or organization: Not on file     Attends meetings of clubs or organizations: Not on file     Relationship status: Not on file     Intimate partner violence:     Fear of current or ex partner: Not on file     Emotionally abused: Not on file     Physically abused: Not on file     Forced sexual activity: Not on file   Other Topics Concern     Not on file   Social History Narrative    She will be starting college in [place] in the fall of 2016 for Chemical Engineering.         OBJECTIVE:            Vitals:    05/14/19 1000   BP: 92/54   Pulse: 88   Resp: 14   SpO2: 98%     Weight: 135 lb (61.2 kg)    Wt Readings from Last 3  Encounters:   05/14/19 135 lb (61.2 kg)   01/09/19 140 lb (63.5 kg)   10/25/18 139 lb 1 oz (63.1 kg)     Body mass index is 20.53 kg/m .        Physical Exam:  GENERAL APPEARANCE: A&A, NAD, well hydrated, well nourished, well groomed  NECK: Supple, without lymphadenopathy, no thyroid mass, no JVD, no bruit  CV: RRR, no M/G/R   LUNGS: CTAB, normal respiratory effort  SKIN:  Normal skin turgor, no lesions/rashes, warm and dry   PSYCHIATRIC;  Mood appropriate, memory intact, good eye contact, engaged in conversation, no inattention noted, did no require redirection

## 2021-05-29 NOTE — TELEPHONE ENCOUNTER
RN cannot approve Refill Request    RN can NOT refill this medication med is not covered by policy/route to provider     . Last office visit: Visit date not found Last Physical: Visit date not found Last MTM visit: Visit date not found Last visit same specialty: 5/14/2019 Anais Sheikh, NP.  Next visit within 3 mo: Visit date not found  Next physical within 3 mo: Visit date not found      Thu Washburn, [facility] Triage/Med Refill 5/24/2019    Requested Prescriptions   Pending Prescriptions Disp Refills     ibuprofen (ADVIL,MOTRIN) 600 MG tablet [Pharmacy Med Name: IBUPROFEN TABS 600MG] 60 tablet 1     Sig: TAKE 1 TABLET DAILY WITH FOOD AS NEEDED FOR PAIN       There is no refill protocol information for this order

## 2021-05-29 NOTE — TELEPHONE ENCOUNTER
Refill Approved    Rx renewed per Medication Renewal Policy. Medication was last renewed on 6/4/18.    Thu Washburn, [facility] Triage/Med Refill 6/3/2019     Requested Prescriptions   Pending Prescriptions Disp Refills     esomeprazole (NEXIUM) 40 MG capsule [Pharmacy Med Name: ESOMEPRAZOLE MAG DR CAPS 40MG] 90 capsule 3     Sig: TAKE 1 CAPSULE EVERY MORNING BEFORE BREAKFAST       GI Medications Refill Protocol Passed - 6/2/2019  4:54 AM        Passed - PCP or prescribing provider visit in last 12 or next 3 months.     Last office visit with prescriber/PCP: 10/25/2018 Alanna Oliva MD OR same dept: 5/14/2019 Anais Sheikh NP OR same specialty: 5/14/2019 Anais Sheikh NP  Last physical: 1/9/2019 Last MTM visit: Visit date not found   Next visit within 3 mo: Visit date not found  Next physical within 3 mo: Visit date not found  Prescriber OR PCP: Alanna Oliva MD  Last diagnosis associated with med order: 1. Chronic Reflux Esophagitis  - esomeprazole (NEXIUM) 40 MG capsule [Pharmacy Med Name: ESOMEPRAZOLE MAG DR CAPS 40MG]; TAKE 1 CAPSULE EVERY MORNING BEFORE BREAKFAST  Dispense: 90 capsule; Refill: 3    If protocol passes may refill for 12 months if within 3 months of last provider visit (or a total of 15 months).

## 2021-05-30 VITALS — BODY MASS INDEX: 20.6 KG/M2 | WEIGHT: 135.5 LBS

## 2021-05-30 NOTE — TELEPHONE ENCOUNTER
Controlled Substance Refill Request  Medication:   Requested Prescriptions     Pending Prescriptions Disp Refills     ADDERALL XR 20 mg 24 hr capsule [Pharmacy Med Name: ADDERALL XR 20MG] 30 capsule 0     Sig: TAKE 1 CAPSULE BY MOUTH EVERY MORNING     Date Last Fill: 5/14/19  [facility]   Submit electronically to pharmacy  Controlled Substance Agreement on File:   Encounter-Level CSA Scan Date:    There are no encounter-level csa scan date.       Last office visit: Last office visit pertaining to requested medication was  5/14/19.

## 2021-05-31 VITALS — BODY MASS INDEX: 20.31 KG/M2 | WEIGHT: 134 LBS | HEIGHT: 68 IN

## 2021-05-31 VITALS — WEIGHT: 136.6 LBS | BODY MASS INDEX: 20.7 KG/M2 | HEIGHT: 68 IN

## 2021-05-31 NOTE — TELEPHONE ENCOUNTER
Refill Approved    Rx renewed per Medication Renewal Policy. Medication was last renewed on 12/18/2018.    Kemar Grullon, [facility] Triage/Med Refill 8/29/2019     Requested Prescriptions   Pending Prescriptions Disp Refills     ESTARYLLA 0.25-35 mg-mcg per tablet [Pharmacy Med Name: NORGEST/E ES(ESTARYLLA)TB 28S 0.25/35] 84 tablet 4     Sig: TAKE 1 TABLET DAILY       Oral Contraceptives Protocol Passed - 8/27/2019 11:22 PM        Passed - Visit with PCP or prescribing provider visit in last 12 months      Last office visit with prescriber/PCP: 10/25/2018 Alanna Oliva MD OR same dept: 5/14/2019 Anais Sheikh NP OR same specialty: 5/14/2019 Anais Sheikh NP  Last physical: 1/9/2019 Last MTM visit: Visit date not found   Next visit within 3 mo: Visit date not found  Next physical within 3 mo: Visit date not found  Prescriber OR PCP: Alanna Oliva MD  Last diagnosis associated with med order: 1. Acne  - ESTARYLLA 0.25-35 mg-mcg per tablet [Pharmacy Med Name: NORGEST/E ES(ESTARYLLA)TB 28S 0.25/35]; TAKE 1 TABLET DAILY  Dispense: 84 tablet; Refill: 4    If protocol passes may refill for 12 months if within 3 months of last provider visit (or a total of 15 months).

## 2021-06-01 VITALS — WEIGHT: 135 LBS | HEIGHT: 68 IN | BODY MASS INDEX: 20.46 KG/M2

## 2021-06-01 VITALS — HEIGHT: 68 IN | WEIGHT: 140 LBS | BODY MASS INDEX: 21.22 KG/M2

## 2021-06-01 NOTE — TELEPHONE ENCOUNTER
RN cannot approve Refill Request    RN can NOT refill this medication med is not covered by policy/route to provider       . Last office visit: Visit date not found Last Physical: Visit date not found Last MTM visit: Visit date not found Last visit same specialty: 5/14/2019 Anais Sheikh, NP.  Next visit within 3 mo: Visit date not found  Next physical within 3 mo: Visit date not found      Thu Washburn, [facility] Triage/Med Refill 9/25/2019    Requested Prescriptions   Pending Prescriptions Disp Refills     ibuprofen (ADVIL,MOTRIN) 600 MG tablet [Pharmacy Med Name: IBUPROFEN TABS 600MG] 60 tablet 6     Sig: TAKE 1 TABLET DAILY WITH FOOD AS NEEDED FOR PAIN       There is no refill protocol information for this order

## 2021-06-02 VITALS — WEIGHT: 140 LBS | HEIGHT: 68 IN | BODY MASS INDEX: 21.22 KG/M2

## 2021-06-02 VITALS — BODY MASS INDEX: 21.14 KG/M2 | WEIGHT: 139.06 LBS

## 2021-06-02 NOTE — TELEPHONE ENCOUNTER
Controlled Substance Refill Request  Medication:   Requested Prescriptions     Pending Prescriptions Disp Refills     dextroamphetamine-amphetamine (ADDERALL XR) 20 MG 24 hr capsule 30 capsule 0     Sig: Take 1 capsule (20 mg total) by mouth every morning.     Date Last Fill: 7/29/19  [facility]   Submit electronically to pharmacy  Controlled Substance Agreement on File:   Encounter-Level CSA Scan Date:    There are no encounter-level csa scan date.       Last office visit: Last office visit pertaining to requested medication was 1/9/19 .

## 2021-06-02 NOTE — TELEPHONE ENCOUNTER
Pt scheduled with Dr. Eddy on 11/1. She is only available fridays and Hazel Ochoa is not available on fridays.

## 2021-06-02 NOTE — TELEPHONE ENCOUNTER
Last Ov: 5/14/19  Due Next: 11/14/19     Appt currently sched: no  LAST CSA: 5/16/19  Last Refill & Qty: 7/29/19 Qty: 30 tab

## 2021-06-02 NOTE — TELEPHONE ENCOUNTER
Refill sent to the pharmacy, she is due for a medication check next month, can we help her schedule this?

## 2021-06-02 NOTE — PROGRESS NOTES
ASSESSMENT/PLAN:       1. Need for vaccination    - Influenza,Seasonal,Quad,INJ =/>6months  - Tdap vaccine,  8yo or older,  IM    2. Attention deficit hyperactivity disorder (ADHD), unspecified ADHD type    - dextroamphetamine-amphetamine (ADDERALL XR) 20 MG 24 hr capsule; Take 1 capsule (20 mg total) by mouth every morning.  Dispense: 30 capsule; Refill: 0  Controlled substance agreement in place  Urine drug screen has been done this year and negative  Patient's insomnia is improved  Appetite is good with stable weight and a BMI of 21    3. Diplegic Cerebral Palsy With Spasticity  The patient will use gabapentin on a as needed basis  She does have a history of migraine headaches that occur about once every 6 months.  They definitely have improved over the years.    Total time spent with the patient face-to-face 25 minutes with greater than 50% of my time being spent in counseling in regard to the above problems            Primo Eddy MD      PROGRESS NOTE   11/1/2019    SUBJECTIVE:  Anjelica Ramirez is a 24 y.o. female  who presents for   Chief Complaint   Patient presents with     Follow-up     Medication Check, a lot of pain in back of the neck      1. Need for vaccination  The patient agrees to have her tetanus booster as well as an influenza vaccine today    2. 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  She still feels like the Adderall provides benefit for her to be able to remember things better and stay on task.  Her insomnia has been less of an issue but she definitely notices more irritability and a short temper when she uses Adderall.    3. Diplegic Cerebral Palsy With  Spasticity  With this condition the patient has some leg cramps and she uses gabapentin 100 mg about 1 day/month.  Patient Active Problem List   Diagnosis     Anxiety     Chronic Reflux Esophagitis     Allergies     Chronic Rhinitis     Acne     Joint Pain, Localized In The Knee     ADHD, Predominantly Inattentive Type     Diplegic Cerebral Palsy With Spasticity     Sleep Disturbances     Fatigue     Memory Lapses Or Loss     Abnormal involuntary movement     Allergic Rhinitis     Headache     Controlled substance agreement signed       Current Outpatient Medications   Medication Sig Dispense Refill     cetirizine (ZYRTEC) 10 MG tablet Take 10 mg by mouth daily.       [START ON 11/12/2019] dextroamphetamine-amphetamine (ADDERALL XR) 20 MG 24 hr capsule Take 1 capsule (20 mg total) by mouth every morning. 30 capsule 0     ESTARYLLA 0.25-35 mg-mcg per tablet TAKE 1 TABLET DAILY 84 tablet 1     fluticasone (FLONASE) 50 mcg/actuation nasal spray INSTILL ONE SPRAY INTO BOTH NOSTRILS ONCE DAILY FOR 30 DAYS.  0     gabapentin (NEURONTIN) 100 MG capsule Take 1-4 caps by mouth before bedtime. 360 capsule 0     ibuprofen (ADVIL,MOTRIN) 600 MG tablet TAKE 1 TABLET DAILY WITH FOOD AS NEEDED FOR PAIN 60 tablet 6     MULTIVIT &MINERALS/FERROUS FUM (MULTI VITAMIN ORAL) Take by mouth.       SUMAtriptan (IMITREX) 25 MG tablet Take 1 tablet (25 mg total) by mouth every 2 (two) hours as needed for migraine. 10 tablet 3     No current facility-administered medications for this visit.        Social History     Tobacco Use   Smoking Status Never Smoker   Smokeless Tobacco Never Used   Tobacco Comment    no exposure            OBJECTIVE:        No results found for this or any previous visit (from the past 240 hour(s)).    Vitals:    11/01/19 1406   BP: 113/60   Pulse: 86   SpO2: 99%   Weight: 138 lb 12.8 oz (63 kg)     Weight: 138 lb 12.8 oz (63 kg)          Physical Exam:  GENERAL APPEARANCE: Very pleasant 24-year-old female, NAD, well  hydrated, well nourished  SKIN:  Normal skin turgor, no lesions/rashes   Mental status exam: The patient is casually dressed and well-groomed with good eye contact and normal speech.  Her thought process is goal-directed with no psychomotor agitation and she has a good range of affect.  There is no delusional thinking paranoia or loose associations.  No suicidal ideations.  She does not show any evidence for hyperactivity  NEURO: no focal findings, normal gait

## 2021-06-03 VITALS — WEIGHT: 135 LBS | BODY MASS INDEX: 20.46 KG/M2 | HEIGHT: 68 IN

## 2021-06-03 VITALS
WEIGHT: 138.8 LBS | HEART RATE: 86 BPM | OXYGEN SATURATION: 99 % | BODY MASS INDEX: 21.1 KG/M2 | DIASTOLIC BLOOD PRESSURE: 60 MMHG | SYSTOLIC BLOOD PRESSURE: 113 MMHG

## 2021-06-04 VITALS
SYSTOLIC BLOOD PRESSURE: 112 MMHG | TEMPERATURE: 98 F | DIASTOLIC BLOOD PRESSURE: 56 MMHG | WEIGHT: 139.4 LBS | BODY MASS INDEX: 21.2 KG/M2 | HEART RATE: 88 BPM | OXYGEN SATURATION: 99 %

## 2021-06-04 VITALS
WEIGHT: 135.8 LBS | HEART RATE: 81 BPM | BODY MASS INDEX: 20.58 KG/M2 | DIASTOLIC BLOOD PRESSURE: 60 MMHG | HEIGHT: 68 IN | OXYGEN SATURATION: 97 % | SYSTOLIC BLOOD PRESSURE: 100 MMHG

## 2021-06-04 NOTE — TELEPHONE ENCOUNTER
Controlled Substance Refill Request  Medication:   Requested Prescriptions     Pending Prescriptions Disp Refills     ADDERALL XR 20 mg 24 hr capsule [Pharmacy Med Name: ADDERALL XR 20MG] 30 capsule 0     Sig: TAKE 1 CAPSULE BY MOUTH EVERY MORNING     Date Last Fill: 11/12/19  [facility]   Submit electronically to pharmacy  Controlled Substance Agreement on File:   Encounter-Level CSA Scan Date:    There are no encounter-level csa scan date.       Last office visit: Last office visit pertaining to requested medication was 11/1/19.

## 2021-06-05 NOTE — TELEPHONE ENCOUNTER
Controlled Substance Refill Request  Medication Name:   Requested Prescriptions     Pending Prescriptions Disp Refills     ADDERALL XR 20 mg 24 hr capsule [Pharmacy Med Name: ADDERALL XR 20MG] 30 capsule 0     Sig: TAKE ONE CAPSULE BY MOUTH EVERY MORNING     Date Last Fill: 12/12/19  [facility]ed [facility]  Submit electronically to pharmacy  Controlled Substance Agreement on file:   Encounter-Level CSA Scan Date:    There are no encounter-level csa scan date.        Last office visit:  11/1/19

## 2021-06-06 NOTE — PROGRESS NOTES
ASSESSMENT/PLAN:       1. Attention deficit hyperactivity disorder (ADHD), unspecified ADHD type  -Stable at this time, using only sparing Adderall as it can have some side effects for her.  Updating drug screen as well as controlled substance agreement today and she will follow-up in 6 months and sooner if necessary.  - dextroamphetamine-amphetamine (ADDERALL XR) 20 MG 24 hr capsule; Take 1 capsule (20 mg total) by mouth every morning.  Dispense: 30 capsule; Refill: 0  - Drug Abuse 1+, Urine    2. Diplegic Cerebral Palsy With Spasticity  -Doing well with her occasional use of gabapentin, again does not typically take it nightly as it can make her quite tired and dizzy for 12 hours.  Continue on this.  She will look into physical therapy when she moves to [place] as well and I am happy to provide her a referral for PT once she is out there.  - gabapentin (NEURONTIN) 100 MG capsule; Take 1-4 caps by mouth before bedtime.  Dispense: 360 capsule; Refill: 3    3. Change in bowel habit  -She has a fairly low fiber diet we discussed that she should add fiber into her diet and this should help.  I am checking basic labs as listed below to look for obvious signs of infection and if things continue to be an issue she will let me know and we could consider proceeding with a colonoscopy  - HM2(CBC w/o Differential)  - Comprehensive Metabolic Panel  - Erythrocyte Sedimentation Rate    4. Body aches  - ibuprofen (ADVIL,MOTRIN) 600 MG tablet; Take 1 tablet (600 mg total) by mouth every 8 (eight) hours as needed for pain.  Dispense: 60 tablet; Refill: 6    5. Nonintractable headache, unspecified chronicity pattern, unspecified headache type  -Very occasional use of Imitrex, renewing today for her  - SUMAtriptan (IMITREX) 25 MG tablet; Take 1 tablet (25 mg total) by mouth every 2 (two) hours as needed for migraine.  Dispense: 10 tablet; Refill: 3    6. Allergic rhinitis, unspecified seasonality, unspecified trigger  -Doing  well with the Flonase, renewal today  - fluticasone propionate (FLONASE) 50 mcg/actuation nasal spray; 2 sprays into each nostril daily.  Dispense: 48 g; Refill: 3    7. Controlled substance agreement signed      Return in about 6 months (around 9/10/2020).      Lacey Gabriel MD      PROGRESS NOTE   3/10/2020    SUBJECTIVE:  Anjelica Ramirez is a 24 y.o. female  who presents for follow up.     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 She does javier refill so f her medications for this.     She does need a referral for PT for her CP. She has had increased hip pain lately. She will need a PT referral for when she moves.     She has had increased stomach issues lately. She has been in [place] for 1.5 months. She was in [place], and her diet was very different and then it started switching back. She is back in [place] now and it's not getting better. She will get up to urinate two times a night and will have bowel movements maybe two times a day. She does find that it is a normal consistency. She does have a lot of gas.     B: Eggs, oatmeal, Croatian toast  L: nataliia Cummins  D: Sushi, spaghetti, hard to keep veggies around.     In [place] they ate a lot of meat, which was what was so different. She is fairly vegetarian at school with a fair amount of carbs. She does eat some dried fruit. She does not have stomach pain. She does have normal urination. She does have normal periods as well.     She does limit the Adderall to only when she needs it. It can make her angry. She doesn't know what happens when she graduates. She is trying to get what she needs now.     She can't drive for 12 hours after taking the gabapentin, doesn't take it daily. She gets quite dizzy when she takes it. She does find that it is effective for her legs, will have issues with leg cramping.     She is getting migraines only about 2 times a  year which is better than it used to be, which was two times a week.Her last migraine was in May.   Chief Complaint   Patient presents with     Medication Management     Patient would like to review her current medications. Patient will be moving to [place] for three months starting in May, needing refills to get her through this time frame.     Referral     Patient would like to discuss a PT referral, needing recommendations closer to [place].      GI Problem     Patient would like to discuss GI concerns. Patient states she is waking up two times during the night to have a bowel movement, also goes two or more times during the day. The stool appears normal, not loose and patient does not feel constipated. No abd pain.          Patient Active Problem List   Diagnosis     Anxiety     Chronic Reflux Esophagitis     Allergies     Chronic Rhinitis     Acne     Joint Pain, Localized In The Knee     ADHD, Predominantly Inattentive Type     Diplegic Cerebral Palsy With Spasticity     Sleep Disturbances     Fatigue     Memory Lapses Or Loss     Abnormal involuntary movement     Allergic Rhinitis     Headache     Controlled substance agreement signed       Current Outpatient Medications   Medication Sig Dispense Refill     cetirizine (ZYRTEC) 10 MG tablet Take 10 mg by mouth daily.       dextroamphetamine-amphetamine (ADDERALL XR) 20 MG 24 hr capsule Take 1 capsule (20 mg total) by mouth every morning. 30 capsule 0     ESTARYLLA 0.25-35 mg-mcg per tablet TAKE 1 TABLET DAILY 84 tablet 2     fluticasone propionate (FLONASE) 50 mcg/actuation nasal spray 2 sprays into each nostril daily. 48 g 3     gabapentin (NEURONTIN) 100 MG capsule Take 1-4 caps by mouth before bedtime. 360 capsule 3     ibuprofen (ADVIL,MOTRIN) 600 MG tablet Take 1 tablet (600 mg total) by mouth every 8 (eight) hours as needed for pain. 60 tablet 6     MULTIVIT &MINERALS/FERROUS FUM (MULTI VITAMIN ORAL) Take by mouth.       SUMAtriptan (IMITREX) 25 MG tablet Take 1  tablet (25 mg total) by mouth every 2 (two) hours as needed for migraine. 10 tablet 3     No current facility-administered medications for this visit.        Social History     Tobacco Use   Smoking Status Never Smoker   Smokeless Tobacco Never Used   Tobacco Comment    no exposure            OBJECTIVE:        Recent Results (from the past 240 hour(s))   HM2(CBC w/o Differential)   Result Value Ref Range    WBC 4.8 4.0 - 11.0 thou/uL    RBC 4.61 3.80 - 5.40 mill/uL    Hemoglobin 13.8 12.0 - 16.0 g/dL    Hematocrit 41.5 35.0 - 47.0 %    MCV 90 80 - 100 fL    MCH 30.0 27.0 - 34.0 pg    MCHC 33.4 32.0 - 36.0 g/dL    RDW 12.0 11.0 - 14.5 %    Platelets 302 140 - 440 thou/uL    MPV 7.2 7.0 - 10.0 fL   Comprehensive Metabolic Panel   Result Value Ref Range    Sodium 138 136 - 145 mmol/L    Potassium 4.7 3.5 - 5.0 mmol/L    Chloride 104 98 - 107 mmol/L    CO2 23 22 - 31 mmol/L    Anion Gap, Calculation 11 5 - 18 mmol/L    Glucose 83 70 - 125 mg/dL    BUN 8 8 - 22 mg/dL    Creatinine 0.72 0.60 - 1.10 mg/dL    GFR MDRD Af Amer >60 >60 mL/min/1.73m2    GFR MDRD Non Af Amer >60 >60 mL/min/1.73m2    Bilirubin, Total 0.6 0.0 - 1.0 mg/dL    Calcium 9.9 8.5 - 10.5 mg/dL    Protein, Total 7.3 6.0 - 8.0 g/dL    Albumin 4.4 3.5 - 5.0 g/dL    Alkaline Phosphatase 66 45 - 120 U/L    AST 18 0 - 40 U/L    ALT 25 0 - 45 U/L   Erythrocyte Sedimentation Rate   Result Value Ref Range    Sed Rate 5 0 - 20 mm/hr   Drug Abuse 1+, Urine   Result Value Ref Range    Amphetamines Screen Negative Screen Negative    Benzodiazepines Screen Negative Screen Negative    Opiates Screen Negative Screen Negative    Phencyclidine Screen Negative Screen Negative    THC Screen Negative Screen Negative    Barbiturates Screen Negative Screen Negative    Cocaine Metabolite Screen Negative Screen Negative    Methadone Screen Negative Screen Negative    Oxycodone Screen Negative Screen Negative    Creatinine, Urine 69.1 mg/dL       Vitals:    03/10/20 1040   BP:  "100/60   Patient Site: Left Arm   Patient Position: Sitting   Cuff Size: Adult Regular   Pulse: 81   SpO2: 97%   Weight: 135 lb 12.8 oz (61.6 kg)   Height: 5' 8\" (1.727 m)     Weight: 135 lb 12.8 oz (61.6 kg)          Physical Exam:  GENERAL APPEARANCE: A&A, NAD, well hydrated, well nourished  SKIN:  Normal skin turgor, no lesions/rashes   HEENT: moist mucous membranes, no rhinorrhea  NECK: Normal  CV: RRR, no M/G/R   LUNGS: CTAB  ABDOMEN: S&NT, no masses, some fullness in her left abdomen consistent with stool  EXTREMITY: no edema   NEURO: no gross deficits, reflexes are brisk  Psych: Affect is anxious, she is casually dressed and groomed, her thought process and speech pattern are normal    "

## 2021-06-06 NOTE — TELEPHONE ENCOUNTER
Refill Approved    Rx renewed per Medication Renewal Policy. Medication was last renewed on 8/29/19.    Ov: 11/1/19    Lacey Metz, [facility] Triage/Med Refill 2/15/2020     Requested Prescriptions   Pending Prescriptions Disp Refills     ESTARYLLA 0.25-35 mg-mcg per tablet [Pharmacy Med Name: [facility]GE[facility]/E ES(ESTARYLLA)TB 28S 0.25/35] 84 tablet 4     Sig: TAKE 1 TABLET DAILY       Oral Contraceptives Protocol Passed - 2/11/2020 11:23 PM        Passed - Visit with PCP or prescribing provider visit in last 12 months      Last office visit with prescriber/PCP: 10/25/2018 Alanna Oliva MD OR same dept: 11/1/2019 Primo Eddy MD OR same specialty: 11/1/2019 Primo Eddy MD  Last physical: 1/9/2019 Last MTM visit: Visit date not found   Next visit within 3 mo: Visit date not found  Next physical within 3 mo: Visit date not found  Prescriber OR PCP: Alanna Oliva MD  Last diagnosis associated with med order: 1. Acne  - ESTARYLLA 0.25-35 mg-mcg per tablet [Pharmacy Med Name: [facility] ES(ESTARYLLA)TB 28S 0.25/35]; TAKE 1 TABLET DAILY  Dispense: 84 tablet; Refill: 4    If protocol passes may refill for 12 months if within 3 months of last provider visit (or a total of 15 months).

## 2021-06-07 NOTE — TELEPHONE ENCOUNTER
Former patient of [facility] has not established care with another provider.  Please assign refill request to covering provider per Clinic standard process.      Controlled Substance Refill Request  Medication Name:   Requested Prescriptions     Pending Prescriptions Disp Refills     ADDERALL XR 20 mg 24 hr capsule [Pharmacy Med Name: ADDERALL XR 20MG] 30 capsule 0     Sig: TAKE ONE CAPSULE BY MOUTH EVERY MORNING     Date Last Fill: 3/10/20  Requested Pharmacy: [facility]  Submit electronically to pharmacy  Controlled Substance Agreement on file:   Encounter-Level CSA Scan Date:    There are no encounter-level csa scan date.        Last office visit:  4/7/20

## 2021-06-07 NOTE — PROGRESS NOTES
"Anjelica Ramirez is a 24 y.o. female who is being evaluated via a billable telephone visit.      The patient has been notified of following:     \"This telephone visit will be conducted via a call between you and your physician/provider. We have found that certain health care needs can be provided without the need for a physical exam.  This service lets us provide the care you need with a short phone conversation.  If a prescription is necessary we can send it directly to your pharmacy.  If lab work is needed we can place an order for that and you can then stop by our lab to have the test done at a later time.    If during the course of the call the physician/provider feels a telephone visit is not appropriate, you will not be charged for this service.\"     Patient has given verbal consent to a Telephone visit? Yes    Anjelica Ramirez complains of    Chief Complaint   Patient presents with     Sinus Problem     States that she gets sinus infections a lot. This one has been going on since December. Was prescribed cipro on 3/26/20 and augmentin on 3/21/20. Did not finish bottle since it was not doing anything.      Ear Pain     In both ears. Started on 3/7/20.       Facial Pain     Generalized Body Aches     Thinks that it might have to do with cipro.        I have reviewed and updated the patient's Past Medical History, Social History, Family History and Medication List.    ALLERGIES  Patient has no known allergies.    Additional provider notes: Calling with concerns of sinus and bilateral ear pain that she has been battling for over one month. She has completed 2 fulls rounds of antibiotics in that time: Augmentin and Cipro, last dose 4/5/2020. She does have a history of seeing an ENT specialist when she was much younger due to her sinus issues. She denies having any surgery on her sinuses, although it was proposed to her many years ago and she declined. She has never had a CT of her sinuses. Four weeks ago, her pain began " in her left ear and for the last 3 weeks, her sinuses and both ears have been constantly painful with a pressure sensations. She does experience shooting pain intermittently. Her family has noticed that she is talking louder and her hearing has been a little muffled but she has not noticed any drainage on her pillow. She has been taking Sudafed but ran out yesterday and she continues to perform sinus flushes at home. She is taking Ibuprofen 600 mg two times a day for chronic hip pain/problems. Denies fevers, chills, vomiting, diarrhea or shortness of breath today. She is very frustrated.     Assessment/Plan:  1. Sinus pain    - predniSONE (DELTASONE) 20 MG tablet; Take 20 mg by mouth daily for 5 days.  Dispense: 5 tablet; Refill: 0  - fluticasone propionate (FLONASE) 50 mcg/actuation nasal spray; 2 sprays into each nostril daily.  Dispense: 16 g; Refill: 5  -no further antibiotics at this time, her last dose of antibiotic (Cipro) was on 4/5/2020  -she will need a CT of her sinuses at some point and will need to return to see ENT after COVID restrictions are lifted  -encouraged her to use Flonase daily and to continue with sinus rinses, continue with Sudafed and OTC pain reliever    2. Ear pain, bilateral    - predniSONE (DELTASONE) 20 MG tablet; Take 20 mg by mouth daily for 5 days.  Dispense: 5 tablet; Refill: 0  - fluticasone propionate (FLONASE) 50 mcg/actuation nasal spray; 2 sprays into each nostril daily.  Dispense: 16 g; Refill: 5  -no further antibiotics at this time, her last dose of antibiotic (Cipro) was on 4/5/2020  -she will need a CT of her sinuses at some point and will need to return to see ENT after COVID restrictions are lifted  -encouraged her to use Flonase daily and to continue with sinus rinses, continue with Sudafed and OTC pain reliever            Phone call duration:  14 minutes    Shireen Carlson, [facility]

## 2021-06-07 NOTE — TELEPHONE ENCOUNTER
Last Med Check: 3/10/2020     Next med check due on:  9/10/2020    CSA on File:  3/10/2020    Future Appointment Scheduled ? no    Last Med Refill? 3/10/2020    Crystal Rayo MA

## 2021-06-07 NOTE — TELEPHONE ENCOUNTER
Hx Sinus infection since Dec.   Hx Ear pain x 1 mo  Cipro course complete  Ear pain AU  Hearing affected according to family    Provided care advice as noted.     Chaparrita Gasca RN    Reason for Disposition    Severe earache pain    Protocols used: EARACHE-A-[place]

## 2021-06-08 NOTE — PROGRESS NOTES
"Anjelica Ramirez is a 24 y.o. female who is being evaluated via a billable telephone visit.      The patient has been notified of following:     \"This telephone visit will be conducted via a call between you and your physician/provider. We have found that certain health care needs can be provided without the need for a physical exam.  This service lets us provide the care you need with a short phone conversation.  If a prescription is necessary we can send it directly to your pharmacy.  If lab work is needed we can place an order for that and you can then stop by our lab to have the test done at a later time.    Telephone visits are billed at different rates depending on your insurance coverage. During this emergency period, for some insurers they may be billed the same as an in-person visit.  Please reach out to your insurance provider with any questions.    If during the course of the call the physician/provider feels a telephone visit is not appropriate, you will not be charged for this service.\"    Patient has given verbal consent to a Telephone visit? Yes    What phone number would you like to be contacted at? [phone]    Patient would like to receive their AVS by AVS Preference: Ruben.    Additional provider notes:       SUBJECTIVE:  Anjelica Ramirez is a 24 y.o. female  who presents for bilateral hip pain.    Patient is evaluated via telephone visit due to the COVID-19 pandemic.  The patient has reached out for a visit secondary to bilateral hip pain.  She has a longstanding history of recurrent hip bursitis and has been struggling with this flare now for several months.  She tried a Medrol Dosepak first which is worked historically and now has a 10-day course of prednisone which again has worked historically.  Prior to starting the steroid she was taking ibuprofen twice daily.  She does have a prescription for gabapentin but this makes her very disoriented so she takes it only sparingly.  Pain is bilateral, " left worse than right.  It is tender to palpation.    She does have stretches that she has been doing, historically was doing about 15 minutes a day took a week off which is when this flare started and now has been doing about 30 minutes/day.  She lasted physical therapy about 2 years ago.  She has tried ice which helps while on but then the results wear off.  She does have some lower back pain as well kind of in the lateral aspects of the back.  She has no known injury to the area.  Chief Complaint   Patient presents with     Hip Pain     Patient has been having bilateral hip pain for a couple of months. The left hip is worse than the right. 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 The Methylprednisone did not help, patient currently has three days left of the Prednisone.         Patient Active Problem List   Diagnosis     Anxiety     Chronic Reflux Esophagitis     Allergies     Chronic Rhinitis     Acne     Joint Pain, Localized In The Knee     ADHD, Predominantly Inattentive Type     Diplegic Cerebral Palsy With Spasticity     Sleep Disturbances     Fatigue     Memory Lapses Or Loss     Abnormal involuntary movement     Allergic Rhinitis     Headache     Controlled substance agreement signed       Current Outpatient Medications   Medication Sig Dispense Refill     ADDERALL XR 20 mg 24 hr capsule TAKE ONE CAPSULE BY MOUTH EVERY MORNING 30 capsule 0     cetirizine (ZYRTEC) 10 MG tablet Take 10 mg by mouth daily.       ergocalciferol, vitamin D2, (VITAMIN D2 ORAL) Take by mouth daily.       ESTARYLLA 0.25-35 mg-mcg per tablet TAKE 1 TABLET DAILY 84 tablet 2     ferrous sulfate (IRON ORAL) Take by mouth daily.       fluticasone propionate (FLONASE) 50 mcg/actuation nasal spray 2 sprays into each nostril daily. 48 g 3     ibuprofen (ADVIL,MOTRIN) 600 MG tablet Take 1 tablet (600 mg total) by mouth every 8 (eight) hours as needed for pain. 60 tablet 6     MULTIVIT  &MINERALS/FERROUS FUM (MULTI VITAMIN ORAL) Take by mouth.       predniSONE (DELTASONE) 10 mg tablet        gabapentin (NEURONTIN) 100 MG capsule Take 1-4 caps by mouth before bedtime. 360 capsule 3     SUMAtriptan (IMITREX) 25 MG tablet Take 1 tablet (25 mg total) by mouth every 2 (two) hours as needed for migraine. 10 tablet 3     No current facility-administered medications for this visit.        Social History     Tobacco Use   Smoking Status Never Smoker   Smokeless Tobacco Never Used   Tobacco Comment    no exposure            OBJECTIVE:        No results found for this or any previous visit (from the past 240 hour(s)).    There were no vitals filed for this visit.  No data recorded      Assessment/Plan:  1. Trochanteric bursitis of both hips  -Unfortunately has been now struggling with bursitis for several months.  Given her continued struggles will refer to PT first and if that is not helping I be happy to see her in clinic for a bursal injection or refer to the spine clinic to see if they can get her in sooner as I am not face-to-face with patients again until June.  She will let me know if we cannot get her in sooner and we can consider referral to the spine clinic sooner.  - Ambulatory referral to PT/OT    2. Diplegic Cerebral Palsy With Spasticity  -As stated above  - Ambulatory referral to PT/OT        Phone call duration:  13 minutes    Lacey Gabriel MD

## 2021-06-08 NOTE — PROGRESS NOTES
[facility] Rehabilitation Daily Progress     Patient Name: Anjelica Ramirez  Date: 6/10/2020  Visit #: 3/6  Referral Diagnosis: bilateral hip pain  Referring provider: Lacey Gabriel*  Visit Diagnosis:     ICD-10-CM    1. Trochanteric bursitis of both hips  M70.61     M70.62    2. Chronic bilateral low back pain without sciatica  M54.5     G89.29      Patient Active Problem List   Diagnosis     Anxiety     Chronic Reflux Esophagitis     Allergies     Chronic Rhinitis     Acne     Joint Pain, Localized In The Knee     ADHD, Predominantly Inattentive Type     Diplegic Cerebral Palsy With Spasticity     Sleep Disturbances     Fatigue     Memory Lapses Or Loss     Abnormal involuntary movement     Allergic Rhinitis     Headache     Controlled substance agreement signed         Assessment:     Today patient returns for 2nd follow up visit reporting she is feeling better right leg > left. She benefits from cuing for posture and technique with exercises.  Patient demonstrates difficulty with SLR and poor core stability.    From Eval:  Patient's signs and symptoms are consistent with bilateral hip bursitis, with bilateral low back pain, anterior pelvic tilt.  Patient responded well to manual therapy.      HEP/POC compliance is  good .  Patient demonstrates understanding/independence with home program.  Patient is appropriate to continue with skilled physical therapy intervention, as indicated by initial plan of care.    Goal Status:  Pt. will demonstrate/verbalize independence in self-management of condition in : 6 weeks  Pt. will be independent with home exercise program in : 6 weeks  Pt. will report decreased intensity, frequency of : Pain;in 6 weeks  Pt. will have improved quality of sleep: with less pain;waking less times/night;in 6 weeks  Pt. will be able to walk : with no pain;for community mobility;in 6 weeks  Patient will sit: 60 minutes;for work;with no pain;with less difficultty    No data  "recorded      Plan / Patient Education:     Continue with initial plan of care.  Progress with home program as tolerated.    Plan for next visit: review HEP, add abdominal strengthening, hip strengthening, continue manual therapy prone    Subjective:     Right side is way better. Left side is about the same 2-3/10.  Both sides are  to the touch.    Functional limitations are described as occurring with:   ascending and descending stairs or curbs  sitting    sleeping  sports or recreation    standing    walking      Objective:       Treatment Today       Patient Instruction:  EDU on seated pelvic tilts and posture in sitting/standing and need for more PPT    Manual Therapy:  MFR layers 1-3 bilateral: glut max, glut med, piriformis, hamstrings in prone with pillow under hips, quads, TFL supine.    Exercises:  Exercise #1: manual stretching quads, hip flexors, piriformis  Comment #1: 30\" x 2 bilateral  Exercise #2: standing quad stretch - HEP  Comment #2: 30\"x 2 bilateral  Exercise #3: 1/2 kneel hip flexor stretch - needed to review today with PPT and upright posture  Comment #3: 30\" x 2  bilateral  Exercise #4: supine piriformis stretch - figure 4 progression  Comment #4: 30\" x 2 bilateral  Exercise #5: supine PPT, step 2 hip adduction squeeze, step 3 glute bridge  Comment #5: hold 5 sec x 10  Exercise #6: supine SLR  Comment #6: 5-10 reps  Exercise #7: standing hip abduction w/ PPT  Comment #7: 10 reps        TREATMENT MINUTES COMMENTS   Evaluation     Self-care/ Home management     Manual therapy 40    Neuromuscular Re-education     Therapeutic Activity     Therapeutic Exercises 20 See above flowsheet   Gait training     Modality__________________                Total 60    Blank areas are intentional and mean the treatment did not include these items.       Moiz Michael, PT  6/10/2020    "

## 2021-06-08 NOTE — TELEPHONE ENCOUNTER
Controlled Substance Refill Request  Medication Name:   Requested Prescriptions     Pending Prescriptions Disp Refills     ADDERALL XR 20 mg 24 hr capsule [Pharmacy Med Name: ADDERALL XR 20MG] 30 capsule 0     Sig: TAKE ONE CAPSULE BY MOUTH EVERY MORNING     Date Last Fill: 4/13/20  [facility]  Submit electronically to pharmacy  Controlled Substance Agreement on file:   Encounter-Level CSA Scan Date:    There are no encounter-level csa scan date.        Last office visit:  5/15/20

## 2021-06-08 NOTE — TELEPHONE ENCOUNTER
RN cannot approve Refill Request    RN can NOT refill this medication medication not on med list. Last office visit: 10/25/2018 Alanna Oliva MD Last Physical: 1/9/2019 Last MTM visit: Visit date not found Last visit same specialty: 3/10/2020 Lacey Gabriel MD.  Next visit within 3 mo: Visit date not found  Next physical within 3 mo: Visit date not found      Haydee Scanlon, [facility] Triage/Med Refill 5/31/2020    Requested Prescriptions   Pending Prescriptions Disp Refills     esomeprazole (NEXIUM) 40 MG capsule [Pharmacy Med Name: ESOMEPRAZOLE MAG DR CAPS 40MG] 90 capsule 3     Sig: TAKE 1 CAPSULE EVERY MORNING BEFORE BREAKFAST       GI Medications Refill Protocol Passed - 5/27/2020 11:57 PM        Passed - PCP or prescribing provider visit in last 12 or next 3 months.     Last office visit with prescriber/PCP: 10/25/2018 Alanna Oliva MD OR same dept: 3/10/2020 Lacey Gabriel MD OR same specialty: 3/10/2020 Lacey Gabriel MD  Last physical: 1/9/2019 Last MTM visit: Visit date not found   Next visit within 3 mo: Visit date not found  Next physical within 3 mo: Visit date not found  Prescriber OR PCP: Alanna Oliva MD  Last diagnosis associated with med order: 1. Chronic Reflux Esophagitis  - esomeprazole (NEXIUM) 40 MG capsule [Pharmacy Med Name: ESOMEPRAZOLE MAG DR CAPS 40MG]; TAKE 1 CAPSULE EVERY MORNING BEFORE BREAKFAST  Dispense: 90 capsule; Refill: 3    If protocol passes may refill for 12 months if within 3 months of last provider visit (or a total of 15 months).

## 2021-06-08 NOTE — PROGRESS NOTES
[facility] Rehabilitation Daily Progress     Patient Name: Anjelica Ramirez  Date: 5/26/2020  Visit #: 2/6  Referral Diagnosis: bilateral hip pain  Referring provider: Lacey Gabriel*  Visit Diagnosis:     ICD-10-CM    1. Trochanteric bursitis of both hips  M70.61     M70.62    2. Chronic bilateral low back pain without sciatica  M54.5     G89.29      Patient Active Problem List   Diagnosis     Anxiety     Chronic Reflux Esophagitis     Allergies     Chronic Rhinitis     Acne     Joint Pain, Localized In The Knee     ADHD, Predominantly Inattentive Type     Diplegic Cerebral Palsy With Spasticity     Sleep Disturbances     Fatigue     Memory Lapses Or Loss     Abnormal involuntary movement     Allergic Rhinitis     Headache     Controlled substance agreement signed         Assessment:     Today patient returns for 1st follow up visit reporting she is feeling worse L>R hip, however after therex and manual therapy today, patient is feeling better. Patient demonstrates difficulty with SLR and poor core stability.    From Eval:  Patient's signs and symptoms are consistent with bilateral hip bursitis, with bilateral low back pain, anterior pelvic tilt.  Patient responded well to manual therapy.      HEP/POC compliance is  good .  Patient demonstrates understanding/independence with home program.  Patient is appropriate to continue with skilled physical therapy intervention, as indicated by initial plan of care.    Goal Status:  Pt. will demonstrate/verbalize independence in self-management of condition in : 6 weeks  Pt. will be independent with home exercise program in : 6 weeks  Pt. will report decreased intensity, frequency of : Pain;in 6 weeks  Pt. will have improved quality of sleep: with less pain;waking less times/night;in 6 weeks  Pt. will be able to walk : with no pain;for community mobility;in 6 weeks  Patient will sit: 60 minutes;for work;with no pain;with less difficultty    No data  "recorded      Plan / Patient Education:     Continue with initial plan of care.  Progress with home program as tolerated.    Plan for next visit: review HEP, add abdominal strengthening, hip strengthening, continue manual therapy prone    Subjective:     Pain is normal right now as long as she doesn't move it or touch it. Laying on her back hurts her hip, L>R. Most comfortable position is sleeping on her L side, sleeping on her stomach her hips don't hurt but she doesn't like doing that.   Patient reports her left hip has been getting worse over the last week. Patient feeling that bumping the hip anytime at all it hurts, sometimes when she stands she feels a dull throbbing pain that makes her feel like vomiting, however has not.     Functional limitations are described as occurring with:   ascending and descending stairs or curbs  sitting    sleeping  sports or recreation    standing    walking      Objective:       Treatment Today       Patient Instruction:  EDU on seated pelvic tilts and posture in sitting/standing and need for more PPT    Manual Therapy:  MFR layers 1-3 bilateral: glut max, glut med, piriformis in prone with pillow under hips    Exercises:  Exercise #1: manual stretching quads, hip flexors, piriformis  Comment #1: 30\" x 2 bilateral  Exercise #2: standing quad stretch - HEP  Comment #2: 30\"x 2 bilateral  Exercise #3: 1/2 kneel hip flexor stretch - needed to review today with PPT and upright posture  Comment #3: 30\" x 2  bilateral  Exercise #4: supine piriformis stretch - figure 4 progression  Comment #4: 30\" x 2 bilateral  Exercise #5: supine PPT, step 2 hip adduction squeeze, step 3 glute bridge  Comment #5: hold 5 sec x 10  Exercise #6: supine SLR  Comment #6: 5-10 reps  Exercise #7: standing hip abduction w/ PPT  Comment #7: 10 reps        TREATMENT MINUTES COMMENTS   Evaluation     Self-care/ Home management     Manual therapy 15    Neuromuscular Re-education     Therapeutic Activity   "   Therapeutic Exercises 30 See above flowsheet   Gait training     Modality__________________                Total 45    Blank areas are intentional and mean the treatment did not include these items.       Lizbeth Souza, PT  5/26/2020

## 2021-06-08 NOTE — PROGRESS NOTES
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 Trochanteric bursitis of both hips  M70.61     M70.62    2. Chronic bilateral low back pain without sciatica  M54.5     G89.29        Precautions / Restrictions : h/o anxiety, ADHD, diplegic cerebral palsy with spasticity       Assessment:      Impairments in  pain, posture, ROM, joint mobility, strength, gait/locomotion and balance  Patient's signs and symptoms are consistent with bilateral hip bursitis, with bilateral low back pain, anterior pelvic tilt.  Patient responded well to manual therapy.  Prognosis to achieve goals is  good   Pt. is appropriate for skilled PT intervention as outlined in the Plan of Care (POC).    Goals:  Pt. will demonstrate/verbalize independence in self-management of condition in : 6 weeks  Pt. will be independent with home exercise program in : 6 weeks  Pt. will report decreased intensity, frequency of : Pain;in 6 weeks  Pt. will have improved quality of sleep: with less pain;waking less times/night;in 6 weeks  Pt. will be able to walk : with no pain;for community mobility;in 6 weeks  Patient will sit: 60 minutes;for work;with no pain;with less difficultty    No data recorded    Barriers to Learning or Achieving Goals:  No Barriers.    Patient's expectations/goals are realistic.    A shared decision making model was used.  The patient's values and choices were respected.  The following represents what was discussed and decided upon by the physical therapist and the patient.          Plan / Patient Instructions:        Plan of Care:   Communication with: Referral Source  Patient Related Instruction: Nature of Condition;Precautions;Treatment plan and rationale;Next steps;Expected outcome;Self Care instruction;Basis of treatment;Body  mechanics;Posture  Times per Week: 1  Number of Weeks: 6  Number of Visits: 6  Discharge Planning: to include HEP  Precautions / Restrictions : h/o anxiety, ADHD, diplegic cerebral palsy with spasticity  Therapeutic Exercise: ROM;Stretching;Strengthening  Neuromuscular Reeducation: posture;balance/proprioception;core  Manual Therapy: soft tissue mobilization;myofascial release;joint mobilization;muscle energy      POC and pathology of condition were reviewed with patient.  Pt. is in agreement with the Plan of Care  A Home Exercise Program (HEP) was initiated today.  Pt. was instructed in exercises by PT and patient was given a handout with detailed instructions.    Plan for next visit: review HEP, add pelvic tilts, hip strengthening, continue manual therapy.     Subjective:           History of Present Illness:    Anjelica is a 24 y.o. female who presents to therapy today with complaints of bilateral lateral hip pain, bilateral low back pain. Date of onset:  3/2020. Onset was gradual. Symptoms are constant and not improving. She denies history of similar symptoms. She describes their previous level of function as not limited    Pain Rating:3  Pain rating at best: 2  Pain rating at worst: 6  Pain description: aching, burning, dull, numbness, pain, sharp, shooting, soreness and weakness    Functional limitations are described as occurring with:   ascending and descending stairs or curbs  sitting    sleeping  sports or recreation    standing    walking             Objective:      Note: Items left blank indicates the item was not performed or not indicated at the time of the evaluation.    Patient Outcome Measures :    No data recorded   Scores range from 0-100%, where a score of 0% represents minimal pain and maximal function. The minimal clinically important difference is a score reduction of 12%.    Examination  1. Trochanteric bursitis of both hips     2. Chronic bilateral low back pain without sciatica        Precautions / Restrictions : h/o anxiety, ADHD, diplegic cerebral palsy with spasticity     Involved side: Bilateral  Posture Observation:      Cervical:  Moderate forward head  Shoulder/Thoracic complex: Moderate bilateral scapular protraction  Lumbopelvic complex: significant anterior pelvic tilt with increased lordosis    Lumbar ROM:    Date: 05/21/20      *Indicate scale AROM AROM AROM   Lumbar Flexion min     Lumbar Extension nil      Right Left Right Left Right Left   Lumbar Sidebending min min       Lumbar Rotation mod mod       Thoracic Flexion min     Thoracic Extension min     Thoracic Sidebending         Thoracic Rotation           Lower Extremity Strength:    Date: 05/21/20      LE strength/5 Right Left Right Left Right Left   Hip Flexion (L1-3) 4 4       Hip Extension (L5-S1) 4 4       Hip Abduction (L4-5) 4 4       Hip Adduction (L2-3) 4 4       Hip External Rotation         Hip Internal Rotation         Knee Extension (L3-4) 5 5       Knee Flexion 5 5       Ankle Dorsiflexion (L4-5) 5 5       Great Toe Extension (L5)         Ankle Plantar flexion (S1)         Abdominals fair       Sensation            Reflex Testing:     Lumbar Dermatomes Right Left UE Reflexes Right Left   Iliac Crest and Groin (L1)   Biceps (C5-6)     Anterior Medial Thigh (L2)   Brachioradialis (C5-6)     Anterior Thigh, Medial Epicondyle Femur (L3)   Triceps (C7-8)     Lateral Thigh, Anterior Knee, Medial Leg/Malleolus (L4)   Cesar s test     Lateral Leg, Dorsal Foot (L5)   LE Reflexes     Lateral Foot (S1)   Patellar (L3-4)     Posterior Leg (S2)   Achilles (S1-2)     Other:   Babinski Response       Palpation:  Myofascial restriction bilateral glut, piriformis, quad, hamstings    Flexibility:  Limited quads, hip flexors    Lumbar Special Tests:      Lumbar Special Tests Right Left SI Tests Right  Left   Quadrant test   SI Compression     Straight leg raise - - SI Distraction     Crossover response - - POSH Test     Slump  "- - Sacral Thrust     Sit-up test  FADIR     Trunk extensor endurance test  Resisted Abduction     Prone instability test  Other:     Pubic shotgun - Other:       Repeated Motion Testing:  NT    Passive Mobility - Joint Integrity:  Not indicated    LE Screen:  Hip PROM:  Mild deficit of IR bilateral.  Hip Scour:  negative.    Treatment Today     Exercises:  Exercise #1: manual stretching quads, hip flexors, piriformis  Comment #1: 30\" x 2 bilateral  Exercise #2: standing quad stretch - HEP  Comment #2: 30\"x 2 bilateral  Exercise #3: 1/2 kneel hip flexor stretch  Comment #3: 30\" x 2  bilateral  Exercise #4: supine piriformis stretch  Comment #4: 30\" x 2 bilateral  Exercise #5: progress to pelvic tilts, hip abductor strengthening next visit.         Manual therapy  MFR layers 1-3 bilateral: glut max, glut med, piriformis, quad and hamstrings.    TREATMENT MINUTES COMMENTS   Evaluation 20    Self-care/ Home management     Manual therapy 25    Neuromuscular Re-education     Therapeutic Activity     Therapeutic Exercises 15    Gait training     Modality__________________                Total 60    Blank areas are intentional and mean the treatment did not include these items.     PT Evaluation Code: (Please list factors)  Patient History/Comorbidities:   Patient Active Problem List   Diagnosis     Anxiety     Chronic Reflux Esophagitis     Allergies     Chronic Rhinitis     Acne     Joint Pain, Localized In The Knee     ADHD, Predominantly Inattentive Type     Diplegic Cerebral Palsy With Spasticity     Sleep Disturbances     Fatigue     Memory Lapses Or Loss     Abnormal involuntary movement     Allergic Rhinitis     Headache     Controlled substance agreement signed      Past Medical History:   Diagnosis Date     Cerebral palsy with spastic/ataxic diplegia     very mild, has had botox injections for toe walking     Mild intermittent asthma     none since very young      Examination: as above  Clinical Presentation: " stable  Clinical Decision Making: low complexity    Patient History/  Comorbidities Examination  (body structures and functions, activity limitations, and/or participation restrictions) Clinical Presentation Clinical Decision Making (Complexity)   No documented Comorbidities or personal factors 1-2 Elements Stable and/or uncomplicated Low   1-2 documented comorbidities or personal factor 3 Elements Evolving clinical presentation with changing characteristics Moderate   3-4 documented comorbidities or personal factors 4 or more Unstable and unpredictable High               Moiz Michael, PT  5/21/2020  9:05 AM

## 2021-06-08 NOTE — TELEPHONE ENCOUNTER
Last Med Check: 3/10/20    Next med check due on: 5/21/20     CSA on File:3/13/20     Future Appointment Scheduled ? 5/21/20    Last Med Refill? 4/13/20     Rain Askew, CMA

## 2021-06-09 NOTE — PROGRESS NOTES
"Anjelica Ramirez is a 24 y.o. female who is being evaluated via a billable telephone visit.      The patient has been notified of following:     \"This telephone visit will be conducted via a call between you and your physician/provider. We have found that certain health care needs can be provided without the need for a physical exam.  This service lets us provide the care you need with a short phone conversation.  If a prescription is necessary we can send it directly to your pharmacy.  If lab work is needed we can place an order for that and you can then stop by our lab to have the test done at a later time.    Telephone visits are billed at different rates depending on your insurance coverage. During this emergency period, for some insurers they may be billed the same as an in-person visit.  Please reach out to your insurance provider with any questions.    If during the course of the call the physician/provider feels a telephone visit is not appropriate, you will not be charged for this service.\"    Patient has given verbal consent to a Telephone visit? Yes    What phone number would you like to be contacted at? [phone]    Patient would like to receive their AVS by AVS Preference: Ruben.    Additional provider notes:        SUBJECTIVE:  Anjelica Ramirez is a 24 y.o. female  who presents for persistent left hip pain.     It is different pain than prior. She has been doing pt for 6 weeks, not getting better. She is having pain on the side of her body, on the squishy part of her hips. It has helped some. It's not getting worse like it was before. She does think that it has gotten better. She does prefer to sleep on her side, but is waking up due to the pain. The more she touches it the more it hurts and the more sensitive it gets.     She does not have pain in the groin. Increased pain with touching the area at all, if she is more active. If she stretches and doesn't lay on her side she is better. She has been " taking ibuprofen daily for this and it helps some for her pain. In the past she has been able to get it to go away. She has done prednisone, last time was back in May and that did help more than the ibuprofen for this pain.   Chief Complaint   Patient presents with     Follow-up     LEFT HIP PAIN DOES GO INTO RIGHT, HAS BEEN DOING PT FOR 6 WEEKS, DOING BETTER BUT PAIN IS NOT GONE, TROUBLE SLEEPING          Patient Active Problem List   Diagnosis     Anxiety     Chronic Reflux Esophagitis     Allergies     Chronic Rhinitis     Acne     Joint Pain, Localized In The Knee     ADHD, Predominantly Inattentive Type     Diplegic Cerebral Palsy With Spasticity     Sleep Disturbances     Fatigue     Memory Lapses Or Loss     Abnormal involuntary movement     Allergic Rhinitis     Headache     Controlled substance agreement signed       Current Outpatient Medications   Medication Sig Dispense Refill     ADDERALL XR 20 mg 24 hr capsule TAKE ONE CAPSULE BY MOUTH EVERY MORNING 30 capsule 0     cetirizine (ZYRTEC) 10 MG tablet Take 10 mg by mouth daily.       ergocalciferol, vitamin D2, (VITAMIN D2 ORAL) Take by mouth daily.       ESTARYLLA 0.25-35 mg-mcg per tablet TAKE 1 TABLET DAILY 84 tablet 2     ferrous sulfate (IRON ORAL) Take by mouth daily.       fluticasone propionate (FLONASE) 50 mcg/actuation nasal spray 2 sprays into each nostril daily. 48 g 3     gabapentin (NEURONTIN) 100 MG capsule Take 1-4 caps by mouth before bedtime. 360 capsule 3     ibuprofen (ADVIL,MOTRIN) 600 MG tablet Take 1 tablet (600 mg total) by mouth every 8 (eight) hours as needed for pain. 60 tablet 6     MULTIVIT &MINERALS/FERROUS FUM (MULTI VITAMIN ORAL) Take by mouth.       esomeprazole (NEXIUM) 40 MG capsule TAKE 1 CAPSULE EVERY MORNING BEFORE BREAKFAST 90 capsule 3     famotidine (PEPCID) 40 MG tablet Take 1 tablet (40 mg total) by mouth every evening. 30 tablet 1     predniSONE (DELTASONE) 10 mg tablet Take 4 tabs daily for 3 days, then 3 tabs  daily for 3 days, then 2 tabs daily for 3 days, then 1 tab daily for 3 days, then stop.. 30 tablet 0     SUMAtriptan (IMITREX) 25 MG tablet Take 1 tablet (25 mg total) by mouth every 2 (two) hours as needed for migraine. 10 tablet 3     No current facility-administered medications for this visit.        Social History     Tobacco Use   Smoking Status Never Smoker   Smokeless Tobacco Never Used   Tobacco Comment    no exposure            OBJECTIVE:        No results found for this or any previous visit (from the past 240 hour(s)).    There were no vitals filed for this visit.  No data recorded         Assessment/Plan:  1. Trochanteric bursitis of both hips  -As she did well with prednisone previously, will have her try a longer taper of this. In the meantime she will continue with her exercises.   -Will consider a referral to PMR at the [facility] orthopedics. Awaiting for call from the PMR clinic  - predniSONE (DELTASONE) 10 mg tablet; Take 4 tabs daily for 3 days, then 3 tabs daily for 3 days, then 2 tabs daily for 3 days, then 1 tab daily for 3 days, then stop..  Dispense: 30 tablet; Refill: 0    2. Chronic Reflux Esophagitis  -Doing well at this time with lifestyle adjustments but still symptoms daily, will start of pepcid and if doing better with this can back down to 20 mg daily.   - famotidine (PEPCID) 40 MG tablet; Take 1 tablet (40 mg total) by mouth every evening.  Dispense: 30 tablet; Refill: 1        Phone call duration:  13 minutes    Lacey Gabriel MD

## 2021-06-09 NOTE — TELEPHONE ENCOUNTER
Controlled Substance Refill Request  Medication Name:   Requested Prescriptions     Pending Prescriptions Disp Refills     ADDERALL XR 20 mg 24 hr capsule [Pharmacy Med Name: ADDERALL XR 20MG] 30 capsule 0     Sig: TAKE ONE CAPSULE BY MOUTH EVERY MORNING     Date Last Fill: 5/18/20  [facility]  Submit electronically to pharmacy  Controlled Substance Agreement on file:   Encounter-Level CSA Scan Date:    There are no encounter-level csa scan date.        Last office visit:  5/15/20

## 2021-06-09 NOTE — TELEPHONE ENCOUNTER
Does she want to get in with me for the bursa injection?     Has she heard from the PMR clinic at the [facility] yet?

## 2021-06-09 NOTE — TELEPHONE ENCOUNTER
Who is calling:  Patient   Reason for Call:  Patient stated that both hip pain has not gotten any better and would like to proceed with hip injection. Patient stated that left hip is worst than right hip. Patient would like a call back with how to proceed with this.  Date of last appointment with primary care: n/a  Okay to leave a detailed message: Yes  [phone]

## 2021-06-09 NOTE — TELEPHONE ENCOUNTER
Controlled Substance Refill Request  Medication Name:   Requested Prescriptions     Pending Prescriptions Disp Refills     ADDERALL XR 20 mg 24 hr capsule [Pharmacy Med Name: ADDERALL XR 20MG] 30 capsule 0     Sig: TAKE ONE CAPSULE BY MOUTH EVERY MORNING     Date Last Fill: 6/19/20  [facility]  Submit electronically to pharmacy  Controlled Substance Agreement on file:   Encounter-Level CSA Scan Date:    There are no encounter-level csa scan date.        Last office visit:  6/30/20

## 2021-06-09 NOTE — TELEPHONE ENCOUNTER
Last Med Check: 3/10/2020    Next med check due on: 9/10/2020    CSA on File: 3/10/2020    Future Appointment Scheduled ? no    Last Med Refill? 5/18/2020    Crystal Rayo MA

## 2021-06-09 NOTE — PROGRESS NOTES
[facility] Rehabilitation Daily Progress     Patient Name: Anjelica Ramirez  Date: 6/19/2020  Visit #: 4/6  Referral Diagnosis: bilateral hip pain  Referring provider: Lacey Gabriel*  Visit Diagnosis:     ICD-10-CM    1. Trochanteric bursitis of both hips  M70.61     M70.62    2. Chronic bilateral low back pain without sciatica  M54.5     G89.29      Patient Active Problem List   Diagnosis     Anxiety     Chronic Reflux Esophagitis     Allergies     Chronic Rhinitis     Acne     Joint Pain, Localized In The Knee     ADHD, Predominantly Inattentive Type     Diplegic Cerebral Palsy With Spasticity     Sleep Disturbances     Fatigue     Memory Lapses Or Loss     Abnormal involuntary movement     Allergic Rhinitis     Headache     Controlled substance agreement signed     Assessment:     Today patient returns for follow up visit reporting she is feeling better right leg > left. She benefits from cuing for posture and technique with exercises.     From Eval:  Patient's signs and symptoms are consistent with bilateral hip bursitis, with bilateral low back pain, anterior pelvic tilt.  Patient responded well to manual therapy.      HEP/POC compliance is  good .  Patient demonstrates understanding/independence with home program.  Patient is appropriate to continue with skilled physical therapy intervention, as indicated by initial plan of care.    Goal Status:  Pt. will demonstrate/verbalize independence in self-management of condition in : 6 weeks  Pt. will be independent with home exercise program in : 6 weeks  Pt. will report decreased intensity, frequency of : Pain;in 6 weeks  Pt. will have improved quality of sleep: with less pain;waking less times/night;in 6 weeks  Pt. will be able to walk : with no pain;for community mobility;in 6 weeks  Patient will sit: 60 minutes;for work;with no pain;with less difficultty    Plan / Patient Education:     Continue with initial plan of care.  Progress with home program  "as tolerated.    Plan for next visit: review HEP as needed, add core strengthening as needed. continue manual therapy prone    Subjective:     Right side is way better. Left side is still the more sore side .5/10 currently not too much pain.  Both sides are  to the touch.  Feels like PT has been very helpful so far.    Functional limitations are described as occurring with:   ascending and descending stairs or curbs  sitting    sleeping  sports or recreation    standing    walking      Objective:       Treatment Today       Patient Instruction:  EDU on seated pelvic tilts and posture in sitting/standing and need for more PPT    Manual Therapy:  MFR layers 1-3 bilateral: glut max, glut med, piriformis, hamstrings in prone with pillow under hips, quads, TFL supine.    Exercises:  Exercise #1: manual stretching quads, hip flexors, piriformis  Comment #1: 30\" x 2 bilateral  Exercise #2: standing quad stretch - HEP  Comment #2: 30\"x 2 bilateral  Exercise #3: 1/2 kneel hip flexor stretch - max cues, switched to supine hip flexor stretch 30\" x 2 bilateral  Comment #3: 30\" x 2  bilateral  Exercise #4: supine piriformis stretch - figure 4 progression  Comment #4: 30\" x 2 bilateral  Exercise #5: supine PPT, step 2 hip adduction squeeze, step 3 glute bridge  Comment #5: hold 5 sec x 10  Exercise #6: supine SLR  Comment #6: 5-10 reps  Exercise #7: standing hip abduction w/ PPT  Comment #7: 10 reps    TREATMENT MINUTES COMMENTS   Evaluation     Self-care/ Home management     Manual therapy 30 See above   Neuromuscular Re-education     Therapeutic Activity     Therapeutic Exercises 10 Reviewed her HEP and printed out updated sheet (Patient lost hers)  Used PTRx code: [id number]   Gait training     Modality__________________                Total 40    Blank areas are intentional and mean the treatment did not include these items.       Carola Aranda, PT  6/19/2020  "

## 2021-06-09 NOTE — PROGRESS NOTES
[facility] Rehabilitation Daily Progress     Patient Name: Anjelica Ramirez  Date: 7/1/2020  Visit #: 6/6  Referral Diagnosis: bilateral hip pain  Referring provider: Lacey Gabriel*  Visit Diagnosis:     ICD-10-CM    1. Trochanteric bursitis of both hips  M70.61     M70.62    2. Chronic bilateral low back pain without sciatica  M54.5     G89.29        Assessment:     Patient is frustrated with progress.  She states that the right hip is 50-75% better since starting physical therapy, but the left is about the same. She is still having difficulty lying on either side and having difficulty sleeping.  She has spoken to her PCP and will be starting a new medication for inflammation.  She will continue with HEP independently with the new meds.            From Eval:  Patient's signs and symptoms are consistent with bilateral hip bursitis, with bilateral low back pain, anterior pelvic tilt.  Patient responded well to manual therapy.      HEP/POC compliance is  good .  Patient demonstrates understanding/independence with home program.  Patient is appropriate to continue with skilled physical therapy intervention, as indicated by initial plan of care.    Goal Status:  Pt. will demonstrate/verbalize independence in self-management of condition in : 6 weeks  Pt. will be independent with home exercise program in : 6 weeks  Pt. will report decreased intensity, frequency of : Pain;in 6 weeks  Pt. will have improved quality of sleep: with less pain;waking less times/night;in 6 weeks  Pt. will be able to walk : with no pain;for community mobility;in 6 weeks  Patient will sit: 60 minutes;for work;with no pain;with less difficultty    Plan / Patient Education:     Continue with initial plan of care.  Progress with home program as tolerated.    Plan for next visit: review HEP as needed, add core strengthening as needed. continue manual therapy prone    Subjective:     Right is approximately 50-75% overall improvement.   "Left is only marginally better.  The back feels better.  Right hip pain 2/10, left hip pain 4/10.  It is very distracting and painful to the touch.    Functional limitations are described as occurring with:   ascending and descending stairs or curbs  sitting    sleeping  sports or recreation    standing    walking      Objective:       Treatment Today       Patient Instruction:  EDU on seated pelvic tilts and posture in sitting/standing and need for more PPT    Manual Therapy:  MFR layers 1-3 left: glut max, piriformis, TFL, quad, hamstrings, ITB.  Patient supine    Manual LE nerve glide      Exercises:  Exercise #1: manual stretching quads, hip flexors, piriformis  Comment #1: 30\" x 2 bilateral  Exercise #2: standing quad stretch - HEP  Comment #2: 30\"x 2 bilateral  Exercise #3: 1/2 kneel hip flexor stretch - max cues, switched to supine hip flexor stretch 30\" x 2 bilateral  Comment #3: 30\" x 2  bilateral  Exercise #4: supine piriformis stretch - figure 4 progression  Comment #4: 30\" x 2 bilateral  Exercise #5: supine PPT, step 2 hip adduction squeeze, step 3 glute bridge  Comment #5: hold 5 sec x 10  Exercise #6: supine SLR  Comment #6: 5-10 reps  Exercise #7: standing hip abduction w/ PPT  Comment #7: 10 reps    TREATMENT MINUTES COMMENTS   Evaluation     Self-care/ Home management     Manual therapy 55    Neuromuscular Re-education     Therapeutic Activity     Therapeutic Exercises     Gait training     Modality___ultrasound__________                Total 55    Blank areas are intentional and mean the treatment did not include these items.       Moiz Michael, PT  7/1/2020  " 2.26

## 2021-06-09 NOTE — TELEPHONE ENCOUNTER
Patient Returning Call  Reason for call:  Patient called back.  Information relayed to patient:  Informed of Dr Jerome message listed below.  Patient states understanding and would like Dr Gabriel to do the injection.  Transferred patient to scheduling.  Patient has additional questions:  No  If YES, what are your questions/concerns:    Okay to leave a detailed message?: No call back needed

## 2021-06-09 NOTE — PROGRESS NOTES
[facility] Rehabilitation Daily Progress     Patient Name: Anjelica Ramirez  Date: 6/24/2020  Visit #: 5/6  Referral Diagnosis: bilateral hip pain  Referring provider: Lacey Gabriel*  Visit Diagnosis:     ICD-10-CM    1. Trochanteric bursitis of both hips  M70.61     M70.62    2. Chronic bilateral low back pain without sciatica  M54.5     G89.29        Assessment:     Patient is frustrated with progress today. Patient getting emotional about having 5 visits and she really isn't noticing any change or progress. PT and patient discussed in length importance of consistency and that this could take time. PT recommended for patient to contact primary with this concern, however it is still okay for us to continue skilled PT in the mean time as long as it isn't causing her increased pain symptoms.    Patient stated PCP recommended ultrasound, so PT trialed ultrasound today on L hip and low back, patient found low back relief but  No relief for L hip.     From Eval:  Patient's signs and symptoms are consistent with bilateral hip bursitis, with bilateral low back pain, anterior pelvic tilt.  Patient responded well to manual therapy.      HEP/POC compliance is  good .  Patient demonstrates understanding/independence with home program.  Patient is appropriate to continue with skilled physical therapy intervention, as indicated by initial plan of care.    Goal Status:  Pt. will demonstrate/verbalize independence in self-management of condition in : 6 weeks  Pt. will be independent with home exercise program in : 6 weeks  Pt. will report decreased intensity, frequency of : Pain;in 6 weeks  Pt. will have improved quality of sleep: with less pain;waking less times/night;in 6 weeks  Pt. will be able to walk : with no pain;for community mobility;in 6 weeks  Patient will sit: 60 minutes;for work;with no pain;with less difficultty    Plan / Patient Education:     Continue with initial plan of care.  Progress with home  "program as tolerated.    Plan for next visit: review HEP as needed, add core strengthening as needed. continue manual therapy prone    Subjective:     R hip has gotten a lot worse, sitting for that long. When leaving therapy, her back hurts less. Patient felt way better this week. Patient doing HEP at least 1x/day. Patient not feeling benefit from PT at all, very frustrated with progress.     Functional limitations are described as occurring with:   ascending and descending stairs or curbs  sitting    sleeping  sports or recreation    standing    walking      Objective:       Treatment Today       Patient Instruction:  EDU on seated pelvic tilts and posture in sitting/standing and need for more PPT    Manual Therapy:  MFR layers 1-3 bilateral: glut max, glut med, piriformis, hamstrings in prone with pillow under hips, quads, TFL supine.    Exercises:  Exercise #1: manual stretching quads, hip flexors, piriformis  Comment #1: 30\" x 2 bilateral  Exercise #2: standing quad stretch - HEP  Comment #2: 30\"x 2 bilateral  Exercise #3: 1/2 kneel hip flexor stretch - max cues, switched to supine hip flexor stretch 30\" x 2 bilateral  Comment #3: 30\" x 2  bilateral  Exercise #4: supine piriformis stretch - figure 4 progression  Comment #4: 30\" x 2 bilateral  Exercise #5: supine PPT, step 2 hip adduction squeeze, step 3 glute bridge  Comment #5: hold 5 sec x 10  Exercise #6: supine SLR  Comment #6: 5-10 reps  Exercise #7: standing hip abduction w/ PPT  Comment #7: 10 reps    TREATMENT MINUTES COMMENTS   Evaluation     Self-care/ Home management 10 EDU on plan of care, importance of consistency and progress with therapy - recommended patient contact PCP with concerns   Manual therapy  See above   Neuromuscular Re-education     Therapeutic Activity     Therapeutic Exercises 5 Reviewed her HEP and printed out updated sheet (Patient lost hers)  Used PTRx code: [id number]   Gait training     Modality___ultrasound__________ 25 Patient " SL on R - 3mhz intensity 1.4 to L hip 10 min, patient prone with pillow under hips, same settings 10 min on lumbar paraspinals                 Total 40    Blank areas are intentional and mean the treatment did not include these items.       Lizbeth Souza, PT  6/24/2020

## 2021-06-10 NOTE — PROGRESS NOTES
Assessment:     1. Impacted cerumen of left ear            Plan:     Symptoms resolved after ear lavage performed by the clinical assistant.  Follow-up as needed.    Subjective:       21 y.o. female presents for evaluation feeling like her left ear is plugged in her hearing is somewhat decreased.  She denies any pain.  She does have a history of allergies.  No other cough or cold symptoms.  Her right ear has been unaffected.  No drainage from her ear.  No other complaints or concerns today.    The following portions of the patient's history were reviewed and updated as appropriate: allergies, current medications, past family history, past medical history, past social history, past surgical history and problem list.    Review of Systems  A 12 point comprehensive review of systems was negative except as noted.     Objective:      /64  Pulse 80  Temp 98.6  F (37  C) (Oral)   Resp 10  Wt 135 lb 8 oz (61.5 kg)  LMP 05/03/2017  SpO2 98%  BMI 20.6 kg/m2  General appearance: alert, appears stated age and cooperative  Ears: Left ear canal is noted for cerumen impaction.  I am unable to visualize her tympanic membrane.  Right tympanic membrane is normal.  She does have a small amount of wax on the right side as well.   Ear lavage performed by our clinical assistant.  Reexamination shows complete resolution of the cerumen impaction.  Tympanic membrane appears normal.    This note has been dictated using voice recognition software. Any grammatical or context distortions are unintentional and inherent to the software

## 2021-06-10 NOTE — PROGRESS NOTES
ASSESSMENT/PLAN:       1. Trochanteric bursitis of right hip  -Injected today bilaterally without issues. Will f/u if not improving.   -I will also follow up with the PMR clinic at the  [facility] [place]  - methylPREDNISolone acetate injection 40 mg (DEPO-MEDROL)  - bupivacaine 0.25 % (2.5 mg/mL) injection 7.5 mg (MARCAINE)    2. Trochanteric bursitis of left hip  -per above  - methylPREDNISolone acetate injection 40 mg (DEPO-MEDROL)  - bupivacaine 0.25 % (2.5 mg/mL) injection 7.5 mg (MARCAINE)    3. Chronic sinusitis, unspecified location  -chronic issues, will let me know if her symptoms are not well managed with the sinus rinses and I can treat with an antibiotic.     4. ADHD  -Doing well at this time with her medications at this time. Controlled substance agreement up to date. F/u in six month.       Return in about 6 months (around 2/4/2021) for Annual physical.      Lacey Gabriel MD      PROGRESS NOTE   8/4/2020    SUBJECTIVE:  Anjelica Ramirez is a 25 y.o. female  who presents for several issues.     Her ADHD is doing well, dose is correct with her current medication.     She is here today mainly for hip pain. She is doing the PT strecthes that she got and it is keeping it from getting worse. She does still have daily pain, although it's not worsening. She is prevented from sitting at her desk, she will need to lay in bed at times. She does have issues with sleeping, hard to lay on her side. She does wake up on her side anyway and her hip will hurt. She will get a bump and then will have excruiating pain. She has done oral steroids in the past and ibuprofen which did help some but did not make it go away.     She does think that maybe she has a sinus infection. She has has pain and pressure in the face for a few weeks. She does have pressure in her forehead, can feel it in her ears at times. She does take Zyrtec daily and does use flonase at times. She does try to do the sinus wash daily. She will do her sinus  wash and then an hour later can have water come out with tipping her head. She has had no fevers.   Chief Complaint   Patient presents with     Hip Pain     Patient has been having bilateral hip pain off and on for years. For the past five or more months the hip pain has gotten worse, mostly in her left hip.      Facial Pain     Patient believes she may have a sinus infection. Has been having facial pain/pressure, headaches, nasal congestion and a produtive cough for three weeks. No fevers.      ADHD     Patient would like to follow up on her ADHD before heading back college on 8/31/2020.          Patient Active Problem List   Diagnosis     Anxiety     Chronic Reflux Esophagitis     Allergies     Chronic Rhinitis     Acne     Joint Pain, Localized In The Knee     ADHD, Predominantly Inattentive Type     Diplegic Cerebral Palsy With Spasticity     Sleep Disturbances     Fatigue     Memory Lapses Or Loss     Abnormal involuntary movement     Allergic Rhinitis     Headache     Controlled substance agreement signed       Current Outpatient Medications   Medication Sig Dispense Refill     ADDERALL XR 20 mg 24 hr capsule TAKE ONE CAPSULE BY MOUTH EVERY MORNING 30 capsule 0     cetirizine (ZYRTEC) 10 MG tablet Take 10 mg by mouth daily.       ergocalciferol, vitamin D2, (VITAMIN D2 ORAL) Take by mouth daily.       ESTARYLLA 0.25-35 mg-mcg per tablet TAKE 1 TABLET DAILY 84 tablet 2     famotidine (PEPCID) 40 MG tablet Take 1 tablet (40 mg total) by mouth every evening. 30 tablet 1     ferrous sulfate (IRON ORAL) Take by mouth daily.       fluticasone propionate (FLONASE) 50 mcg/actuation nasal spray 2 sprays into each nostril daily. 48 g 3     gabapentin (NEURONTIN) 100 MG capsule Take 1-4 caps by mouth before bedtime. 360 capsule 3     ibuprofen (ADVIL,MOTRIN) 600 MG tablet Take 1 tablet (600 mg total) by mouth every 8 (eight) hours as needed for pain. 60 tablet 6     MULTIVIT &MINERALS/FERROUS FUM (MULTI VITAMIN ORAL)  Take by mouth.       SUMAtriptan (IMITREX) 25 MG tablet Take 1 tablet (25 mg total) by mouth every 2 (two) hours as needed for migraine. 10 tablet 3     No current facility-administered medications for this visit.        Social History     Tobacco Use   Smoking Status Never Smoker   Smokeless Tobacco Never Used   Tobacco Comment    no exposure            OBJECTIVE:        No results found for this or any previous visit (from the past 240 hour(s)).    Vitals:    08/04/20 0848   BP: 112/56   Patient Site: Left Arm   Patient Position: Sitting   Cuff Size: Adult Regular   Pulse: 88   Temp: 98  F (36.7  C)   TempSrc: Oral   SpO2: 99%   Weight: 139 lb 6.4 oz (63.2 kg)     Weight: 139 lb 6.4 oz (63.2 kg)          Physical Exam:  GENERAL APPEARANCE: A&A, NAD, well hydrated, well nourished  SKIN:  Normal skin turgor, no lesions/rashes   HEENT: moist mucous membranes, no rhinorrhea, mild sinus TTP bilaterally  NECK: Normal  CV: RRR, no M/G/R   LUNGS: CTAB   EXTREMITY: no edema, bilateral greater trochanters are TTP   NEURO: no gross deficits

## 2021-06-10 NOTE — TELEPHONE ENCOUNTER
Controlled Substance Refill Request  Medication Name:   Requested Prescriptions     Pending Prescriptions Disp Refills     ADDERALL XR 20 mg 24 hr capsule [Pharmacy Med Name: ADDERALL XR 20MG] 30 capsule 0     Sig: TAKE ONE CAPSULE BY MOUTH EVERY MORNING     Date Last Fill: 7/20/20  [facility]  Submit electronically to pharmacy  Controlled Substance Agreement on file:   Encounter-Level CSA Scan Date:    There are no encounter-level csa scan date.        Last office visit:  8/4/20

## 2021-06-10 NOTE — TELEPHONE ENCOUNTER
Right forearm tingling now,      Cortisone shot in hip..this morning in clinic.  She was calling back with tingling.  She has been expecting a call back from  Dr. Gabriel . No call back as of yet.  Right forearm is tingling.now also.    Patient calling [facility], and concerned that she is getting tingling in more parts of her body, now her left arm as well.    Farrah Rodríguez RN  [facility] Triage/refill nurse

## 2021-06-10 NOTE — TELEPHONE ENCOUNTER
Dr. Gabriel, please review. I see that the referral was placed on 7/3/20 and in the notes section it says    [facility].    I called that located and they did not receive the referral. Do you recall if this is where this referral was to go to?    If so, I can refax to [phone].

## 2021-06-10 NOTE — TELEPHONE ENCOUNTER
Patient Returning Call  Reason for call:  Returning call to Dr Gabriel  Information relayed to patient:    No documentation in chart.  Patient has additional questions:  Yes  If YES, what are your questions/concerns:    Patient states Dr Gabriel left a message to call her back.  Okay to leave a detailed message?: Yes

## 2021-06-10 NOTE — TELEPHONE ENCOUNTER
Discussed with patient on 8/4 at 2pm and her right arm tingling was gone, the right foot tingling was gone shortly after her phone call. Will watch for now and discussed that it could have been due to her CP rather than the shot.     It was her whole hand that was tingling, not just a subset of her fingers.

## 2021-06-10 NOTE — PROGRESS NOTES
"Injection - Other    Date/Time: 8/4/2020 9:30 AM  Performed by: Lacey Gabriel MD  Authorized by: Lacey Gabriel MD   Consent: Verbal consent obtained.  Risks and benefits: risks, benefits and alternatives were discussed  Consent given by: patient  Patient understanding: patient states understanding of the procedure being performed  Patient consent: the patient's understanding of the procedure matches consent given  Procedure consent: procedure consent matches procedure scheduled  Relevant documents: relevant documents present and verified  Site marked: the operative site was marked  Required items: required blood products, implants, devices, and special equipment available  Patient identity confirmed: verbally with patient  Time out: Immediately prior to procedure a \"time out\" was called to verify the correct patient, procedure, equipment, support staff and site/side marked as required.  Preparation: Patient was prepped and draped in the usual sterile fashion.  Local anesthesia used: no    Anesthesia:  Local anesthesia used: no    Sedation:  Patient sedated: no    Patient tolerance: Patient tolerated the procedure well with no immediate complications        Greater Trochanter Bursitis Injection Procedure Note    Pre-operative Diagnosis: bilatera greater trochanteric bursitis    Post-operative Diagnosis: same    Indications: failure of conservative therapy    Procedure Details   Verbal consent for the procedure was obtained. After a sterile Betadine prep spinal needle was advanced to the greater trochanters bilaterally. Free flow into the greater trochanteric spaces was confirmed and injected with 1 ml of methylprednisolone 40mg/ml and 3 ml of 0.25% bupivicaine bilaterally. The area was cleansed with isopropyl alcohol and a dressing was applied.    Complications:  None; patient tolerated the procedure well.    "

## 2021-06-11 NOTE — TELEPHONE ENCOUNTER
RN cannot approve Refill Request    RN can NOT refill this medication med is not covered by policy/route to provider. Last office visit: 10/25/2018 Alanna Oliva MD Last Physical: 1/9/2019 Last MTM visit: Visit date not found Last visit same specialty: 8/4/2020 Lacey Gabriel MD.  Next visit within 3 mo: Visit date not found  Next physical within 3 mo: Visit date not found      Thu [place], [facility] Triage/Med Refill 9/24/2020    Requested Prescriptions   Pending Prescriptions Disp Refills     ibuprofen (ADVIL,MOTRIN) 600 MG tablet [Pharmacy Med Name: IBUPROFEN TABS 600MG] 60 tablet 5     Sig: TAKE 1 TABLET DAILY WITH FOOD AS NEEDED FOR PAIN       There is no refill protocol information for this order

## 2021-06-11 NOTE — TELEPHONE ENCOUNTER
Controlled Substance Refill Request  Medication Name:   Requested Prescriptions     Pending Prescriptions Disp Refills     ADDERALL XR 20 mg 24 hr capsule [Pharmacy Med Name: ADDERALL XR 20MG] 30 capsule 0     Sig: TAKE 1 CAPSULE BY MOUTH EVERY MORNING     Date Last Fill: 8/27/20  [facility]  Submit electronically to pharmacy  Controlled Substance Agreement on file:   Encounter-Level CSA Scan Date:    There are no encounter-level csa scan date.        Last office visit:  8/4/20

## 2021-06-11 NOTE — PROGRESS NOTES
[facility] Discharge Summary  Patient Name: Anjelica Ramirez  Date: 9/8/2020  Referral Diagnosis: bilateral hip pain  Referring provider: Lacey Gabriel*  Visit Diagnosis:   1. Trochanteric bursitis of both hips     2. Chronic bilateral low back pain without sciatica         Goals:  Pt. will demonstrate/verbalize independence in self-management of condition in : 6 weeks  Pt. will be independent with home exercise program in : 6 weeks  Pt. will report decreased intensity, frequency of : Pain;in 6 weeks  Pt. will have improved quality of sleep: with less pain;waking less times/night;in 6 weeks  Pt. will be able to walk : with no pain;for community mobility;in 6 weeks  Patient will sit: 60 minutes;for work;with no pain;with less difficultty    Patient was seen for 6 visits for physical therapy of bilateral hip pain from 5/21/2020 to 7/1/2020 with no follow up appointments.   The patient was instructed to follow up with physician's clinic.  The patient discontinued therapy, did not return.  No further therapy is required at this time.    Therapy will be discontinued at this time.  The patient will need a new referral to resume physical therapy treatment. Please see below for patient's current status.    Thank you for your referral.  Lizbeth Souza, PT, DPT  9/8/2020  3:28 PM

## 2021-06-12 NOTE — TELEPHONE ENCOUNTER
Refill Approved    Rx renewed per Medication Renewal Policy. Medication was last renewed on 2/15/2020.    Mora Hollis, [facility] Triage/Med Refill 10/22/2020     Requested Prescriptions   Pending Prescriptions Disp Refills     ESTARYLLA 0.25-35 mg-mcg per tablet [Pharmacy Med Name: NORGEST/E ES(ESTARYLLA)TB 28S 0.25/35] 84 tablet 3     Sig: TAKE 1 TABLET DAILY       Oral Contraceptives Protocol Passed - 10/21/2020 12:11 AM        Passed - Visit with PCP or prescribing provider visit in last 12 months      Last office visit with prescriber/PCP: 10/25/2018 Alanna Oliva MD OR same dept: 8/4/2020 Lacey Gabriel MD OR same specialty: 8/4/2020 Lacey Gabriel MD  Last physical: 1/9/2019 Last MTM visit: Visit date not found   Next visit within 3 mo: Visit date not found  Next physical within 3 mo: Visit date not found  Prescriber OR PCP: Alanna Oliva MD  Last diagnosis associated with med order: 1. Acne  - ESTARYLLA 0.25-35 mg-mcg per tablet [Pharmacy Med Name: NORGEST/E ES(ESTARYLLA)TB 28S 0.25/35]; TAKE 1 TABLET DAILY  Dispense: 84 tablet; Refill: 3    If protocol passes may refill for 12 months if within 3 months of last provider visit (or a total of 15 months).

## 2021-06-13 NOTE — TELEPHONE ENCOUNTER
Spoke with patient. She would like injection with Dr hodges. I explained that Dr Hodges doesn't do injections by ultrasound here in clinic. That's wy we referred her out.   She would like a new referral somewhere closer than [place] for this. She is also hoping this gets done before the end of the year.       Additionally, she would like Dr Hodges's opinion on what she can take for her seasonal allergies.   Itchy eyes, stuffy nose and sinusitis >6 months. She is currently using Flonase and zyrtec daily with sinus washes at night. What else can she do?

## 2021-06-13 NOTE — TELEPHONE ENCOUNTER
Reason for call:  Patient reporting a symptom    Symptom or request: pain in both hops. Went to specialist and he told her to have ultrasound guided injection. Had one injection in aug- it was not unit(s)/s guided however.     Duration (how long have symptoms been present): one year    Have you been treated for this before? Yes    Additional comments:     Phone Number patient can be reached at:    Cell number on file:    Telephone Information:   Mobile [phone]       Best Time:  Anytime     Can we leave a detailed message on this number: Yes    Call taken on 12/22/2020 at 9:59 AM by Edilia Smith

## 2021-06-13 NOTE — TELEPHONE ENCOUNTER
Left message to call back for: patient  Information to relay to patient:  What is patient needing from us? Is she interested in the US guided injection or does she want Dr Gabriel's opinion?

## 2021-06-13 NOTE — TELEPHONE ENCOUNTER
Last Med Check: 6/30/2020    Next med check due on:     CSA on File: y    Future Appointment Scheduled ? n    Last Med Refill? 9/30/2020    Bre Cook

## 2021-06-14 NOTE — TELEPHONE ENCOUNTER
Referral placed, given the time of year though it may be hard to get her in before the end of the year.

## 2021-06-14 NOTE — TELEPHONE ENCOUNTER
Controlled Substance Refill Request  Medication Name:   Requested Prescriptions     Pending Prescriptions Disp Refills     ADDERALL XR 20 mg 24 hr capsule [Pharmacy Med Name: ADDERALL XR 20MG] 30 capsule 0     Sig: TAKE 1 CAPSULE BY MOUTH EVERY MORNING     Date Last Fill: 12/23/20  [facility]  Submit electronically to pharmacy  Controlled Substance Agreement on file:   Encounter-Level CSA Scan Date:    There are no encounter-level csa scan date.        Last office visit:  8/4/20  Haydee Scanlon RN, MA  [facility]    Triage Nurse Advisor

## 2021-06-14 NOTE — TELEPHONE ENCOUNTER
[facility] could [facility]   Please send referral for injection only if appropriate. If you want her to have f/u care or plan of care guidance, please send consult referral

## 2021-06-14 NOTE — TELEPHONE ENCOUNTER
Controlled Substance Refill Request  Medication Name:   Requested Prescriptions     Pending Prescriptions Disp Refills     dextroamphetamine-amphetamine (ADDERALL XR) 20 MG 24 hr capsule 30 capsule 0     Sig: Take 1 capsule (20 mg total) by mouth every morning.     Date Last Fill: 11/17/20  [facility]  Submit electronically to pharmacy  Controlled Substance Agreement on file:   Encounter-Level CSA Scan Date:    There are no encounter-level csa scan date.        Last office visit:  8/4/20

## 2021-06-14 NOTE — PROGRESS NOTES
PROGRESS NOTE   12/18/2017    SUBJECTIVE:  Anjelica Ramirez is a 22 y.o. female  who presents for   Chief Complaint   Patient presents with     ADHD     recheck/refill     Insomnia     hard to fall asleep since being at college     Patient comes in today for follow-up of her ADHD but also because of insomnia.  In terms of the ADHD she seems to be doing well.  She is currently on Adderall 20 mg XR.  This is working well.  She takes in the morning and she is okay for about 4 5 hours or so.  Sometimes after lunch she has kind of the floaty feeling that lasts for about an hour or so and then she is totally fine after that.  When asked her more about this floaty feeling she said that she just does not feel totally plugged in for about an hour red around the lunchtime.  As we talked further she was wondering if maybe her dose was too high or too low because she was having this type of symptom.  She has been on this dose of medication for quite some time.  It seems like this dose is working well.  She finds it hard to concentrate on projects when she is not on the medication she finds her socks in the refrigerator when she started the medication and none of that is happening at all.  She notes that these episodes of feeling floaty just after lunch do not happen every day they happen occasionally.  She honestly is wondering whether this could be related to Benadryl or Zofran that she is taking and if it could be an interaction with those.   Her other concern is that she is having difficulty sleeping.  She has a hard time going to sleep when she is at college.  She is wondering if maybe she should get a weighted blanket and see if that will be helpful.  Many of her friends have weighted blankets and that seems to help him with sleep so she is wondering if perhaps I have an opinion about weighted blankets.  She has been on numerous different kinds of sleep medication without much success.  She went to the school doctor to see  about the insomnia and they suggested that she takes a medication for sleep however again she is reluctant to do that because she has been on just about every kind of medication there is for sleep and has not had good results from them.  When asked her more about that she notes that she got a tremor in her hand when she was on sleep medication several years ago and the tremor never went away and so she is very frightened to try any other kind of sleep medication.  As we talked further she notes that she is always had trouble falling asleep and she cannot seem to turn her mind off.  When she is asleep she seems to be fine is just getting to sleep.    Patient Active Problem List   Diagnosis     Anxiety     Chronic Reflux Esophagitis     Allergies     Chronic Rhinitis     Acne     Joint Pain, Localized In The Knee     ADHD, Predominantly Inattentive Type     Diplegic Cerebral Palsy With Spasticity     Sleep Disturbances     Fatigue     Memory Lapses Or Loss     Abnormal involuntary movement     Allergic Rhinitis     Headache     Knee Injury     Controlled substance agreement signed       Current Outpatient Prescriptions   Medication Sig Dispense Refill     cetirizine (ZYRTEC) 10 MG tablet Take 10 mg by mouth daily.       dextroamphetamine-amphetamine (ADDERALL XR) 20 MG 24 hr capsule TAKE ONE CAPSULE BY MOUTH EVERY MORNING 30 capsule 0     esomeprazole (NEXIUM) 40 MG capsule TAKE 1 CAPSULE EVERY MORNING BEFORE BREAKFAST 90 capsule 2     mometasone (NASONEX) 50 mcg/actuation nasal spray 2 sprays into each nostril 2 (two) times a day.       MULTIVIT &MINERALS/FERROUS FUM (MULTI VITAMIN ORAL) Take by mouth.       SPRINTEC, 28, 0.25-35 mg-mcg per tablet TAKE 1 TABLET DAILY 84 tablet 3     SUMAtriptan (IMITREX) 25 MG tablet Take 1 tablet (25 mg total) by mouth every 2 (two) hours as needed for migraine. 10 tablet 3     No current facility-administered medications for this visit.        No Known Allergies    Past Medical  "History:   Diagnosis Date     Cerebral palsy with spastic/ataxic diplegia     very mild, has had botox injections for toe walking     Mild intermittent asthma     none since very young       Past Surgical History:   Procedure Laterality Date     SKIN LESION EXCISION      benign mole     TONSILLECTOMY AND ADENOIDECTOMY  age 9       History   Smoking Status     Never Smoker   Smokeless Tobacco     Never Used     Comment: no exposure        OBJECTIVE:     /64 (Patient Site: Left Arm, Patient Position: Sitting, Cuff Size: Adult Regular)  Pulse 68 Comment: regular  Temp 98.5  F (36.9  C) (Oral)   Resp 14 Comment: regular  Ht 5' 8\" (1.727 m)  Wt 136 lb 9.6 oz (62 kg)  BMI 20.77 kg/m2    Physical Exam:  GENERAL APPEARANCE: A&A, NAD, well hydrated, well nourished  SKIN:  Normal skin turgor, no lesions/rashes   CV: RRR, no M/G/R   LUNGS: CTAB  EXTREMITY: no swelling noted.  Full range of motion of all 4 extremities.   NEURO: no gross deficits   PSYCHIATRIC;  Mood appropriate, memory intact  A&O x3, thought processes congruent, non-tangential. No hallucinations/delusions. Insight/judgment: intact. Denies suicidal/homicidal ideations.      ASSESSMENT/PLAN:     Attention deficit disorder [F98.8]      1. Attention deficit disorder    2. Insomnia    Patient overall seems to be doing okay.  In terms of the insomnia she is not interested in any kind of treatment at this time.  We talked about maybe having referred to the sleep doctor to see if he can offer any suggestions.  She notes that she had a sleep study done several years ago and it did not show anything but I explained to her the sleep doctor can also help her with trying different strategies that might help with getting her to sleep on a more consistent basis.  At this point she is not interested in a referral but if she is interested in that in the future she certainly will let me know.  In terms of the ADHD it sounds like these episodes of being floaty for " about an hour over lunchtime do not have been all that frequently and she really was wondering if there are reaction to the Benadryl and Zofran.  When I talked about the fact that it would be unlikely for her to have a reaction with Benadryl or Zofran that would act like this she was very reassured.  Again she does not want any further evaluation for these episodes as they do not happen all that frequently.  She finds that the episodes happen more frequently she will let me know.  We discussed the fact that I do not think this is a dose related issue with her Adderall as it works so well before and after this episode so I think that this is probably just a fluke that this is happening in the middle of the day.  At this point she seems to be tolerating her current dose of Adderall well and she has been on this dose for quite some time.  She just got a refill of her Adderall on 11/30/2017 from Dr. Oliva and so when she is due next week for another refill will certainly let us know and we can refill that.  She plans to request a refill on 12/29/2017 so that she can get it before she goes back to college as a spinning problem for her to get this medication while she is at college.  She does note that she has been having a little bit more leg pain but then she also walks more when she is at college that she thinks that is related.  She will continue to monitor this and if she has other problems will let me know.  After long discussion regarding changing her medication dose are not changing her medication dose we have agreed to keep on the same dose and we will see her back in about 6 months for follow-up.  If she has any changes in her symptoms or has worsening of her symptoms or if she thinks that she needs a new dose of medication she certainly should let us know.  She does have a controlled substance agreement on file that was signed in June 2017 so that was not repeated today.  All of her questions were answered  today.  If she has additional questions or concerns will certainly let me know.  Total time spent with patient was at least 25 minutes,  of which greater than fifty percent was spent in counselling and coordination of care of the above medical problems.    Dulce Maria Abreu MD

## 2021-06-15 NOTE — TELEPHONE ENCOUNTER
Refill Approved    Rx renewed per Medication Renewal Policy. Medication was last renewed on 3/10/20.    Daniel Ayers, [facility] Triage/Med Refill 2/15/2021     Requested Prescriptions   Pending Prescriptions Disp Refills     fluticasone propionate (FLONASE) 50 mcg/actuation nasal spray [Pharmacy Med Name: FLUTICASONE PROP NASAL SPRAY 16GM 50MCG] 48 g 3     Sig: USE 2 SPRAYS IN EACH NOSTRIL DAILY       Nasal Steroid Refill Protocol Passed - 2/15/2021 12:01 AM        Passed - Patient has had office visit/physical in last 2 years     Last office visit with prescriber/PCP: 8/4/2020 OR same dept: 8/4/2020 Lacey Gabriel MD OR same specialty: 8/4/2020 Lacey Gabriel MD Last physical: Visit date not found Last MTM visit: Visit date not found    Next appt within 3 mo: Visit date not found  Next physical within 3 mo: Visit date not found  Prescriber OR PCP: Lacey Gabriel MD  Last diagnosis associated with med order: 1. Allergic rhinitis, unspecified seasonality, unspecified trigger  - fluticasone propionate (FLONASE) 50 mcg/actuation nasal spray [Pharmacy Med Name: FLUTICASONE PROP NASAL SPRAY 16GM 50MCG]; USE 2 SPRAYS IN EACH NOSTRIL DAILY  Dispense: 48 g; Refill: 3     If protocol passes may refill for 12 months if within 3 months of last provider visit (or a total of 15 months).

## 2021-06-16 PROBLEM — Z79.899 CONTROLLED SUBSTANCE AGREEMENT SIGNED: Status: ACTIVE | Noted: 2017-06-05

## 2021-06-17 NOTE — TELEPHONE ENCOUNTER
Priyanka message from patient.  Patient is able to see a provider in [place] until she's back in [place].     FYI for Dr Gabriel.

## 2021-06-17 NOTE — TELEPHONE ENCOUNTER
[facility] message sent to patient as she didn't answer her phone.   Her Adderall was approved and sent to [facility] 5/7/21 as requested.   Will await her response to see when she will be back in [place].

## 2021-06-17 NOTE — TELEPHONE ENCOUNTER
Pt notified that she is due for a apt with either office visit med check or physical. She is currently in [place] and will call to schedule apt when she knows when she will be home next.

## 2021-06-17 NOTE — TELEPHONE ENCOUNTER
Please call patient, she is due for a visit if we can help her get scheduled for this.     She could certainly schedule this as a physical

## 2021-06-17 NOTE — TELEPHONE ENCOUNTER
Medication: dextroamphetamine-amphetamine (ADDERALL XR) 20 MG 24 hr capsule     Last Date Filled 12/23/2020  Last appointment addressing medication use: 8/4/2020      Taken as prescribed from physician notes? YES    CSA in last year: NO    Random Utox in last year: NO  (if any of the above answer NO - schedule with PCP)     Opioids + benzodiazepines? NO  (if the above answer YES - schedule with PCP every 6 months)       All responses suggest: Refilling prescription

## 2021-06-17 NOTE — TELEPHONE ENCOUNTER
Left message to call back for: Patient  Information to relay to patient:  Dr. Gabriel's message below and assist patient in scheduling an appointment.     Shu Packer, CMA

## 2021-06-17 NOTE — PROGRESS NOTES
"Anjelica Ramirez is a 22 y.o. female is here for a  Health Maintenance exam. Patient is overall doing well.  Patient had a migraine that lasted 3 days.  This was \"the longest migraine of my life\".  She is wondering if she needs to have a scan done because it lasted so long.  She generally only has a couple migraines per year and they are always easily treated with sumatriptan.  This time, however, she did not have access to her medication and the headache cut out from under her.  She had no other neurologic symptoms and otherwise nothing else was unusual.  She is finished school and plans to travel to [facility] this summer for in-depth studies in [place].  She is very excited about this.  She has had an excellent year in school.  Healthy Habits:   Regular Exercise: Yes and Nono   Sunscreen Use: Yes  Healthy Diet: Yes  Dental Visits Regularly: Yes  Seat Belt: Yes  Sexually active: No  Self Breast Exam Monthly:No  Hemoccults: No  Flex Sig: No  Colonoscopy: No  Lipid Profile: Yes  Glucose Screen: Yes  Prevention of Osteoporosis: Yes  Last Dexa: No  Guns at Home:  Yes  Guns Safety Locks:  Yes  Domestic Violence:  No    Current Outpatient Medications Include:    Current Outpatient Prescriptions:      cetirizine (ZYRTEC) 10 MG tablet, Take 10 mg by mouth daily., Disp: , Rfl:      dextroamphetamine-amphetamine (ADDERALL XR) 20 MG 24 hr capsule, TAKE ONE CAPSULE BY MOUTH EVERY MORNING, Disp: 90 capsule, Rfl: 0     esomeprazole (NEXIUM) 40 MG capsule, TAKE 1 CAPSULE EVERY MORNING BEFORE BREAKFAST, Disp: 90 capsule, Rfl: 0     ESTARYLLA 0.25-35 mg-mcg per tablet, TAKE 1 TABLET DAILY, Disp: 84 tablet, Rfl: 3     fluticasone (FLONASE) 50 mcg/actuation nasal spray, INSTILL ONE SPRAY INTO BOTH NOSTRILS ONCE DAILY FOR 30 DAYS., Disp: , Rfl: 0     MULTIVIT &MINERALS/FERROUS FUM (MULTI VITAMIN ORAL), Take by mouth., Disp: , Rfl:      mometasone (NASONEX) 50 mcg/actuation nasal spray, 2 sprays into each nostril 2 (two) times a day., Disp: , " Rfl:      SUMAtriptan (IMITREX) 25 MG tablet, Take 1 tablet (25 mg total) by mouth every 2 (two) hours as needed for migraine., Disp: 10 tablet, Rfl: 3    Allergies:  No Known Allergies    Past Medical History:   Diagnosis Date     Cerebral palsy with spastic/ataxic diplegia     very mild, has had botox injections for toe walking     Mild intermittent asthma     none since very young       Past Surgical History:   Procedure Laterality Date     SKIN LESION EXCISION      benign mole     TONSILLECTOMY AND ADENOIDECTOMY  age 9       OB History   No data available       Immunization History   Administered Date(s) Administered     DTaP, historic 1997, 1997, 04/02/1998, 11/10/1998, 08/17/2001     HPV Quadrivalent 01/16/2012, 04/16/2012, 08/21/2012     Hep A, historic 08/17/2009, 01/16/2012     Hep B, historic 1997, 1997, 04/02/1998     HiB, historic,unspecified 1997, 1997, 04/02/1998, 11/10/1998     IPV 1997, 1997, 04/02/1998, 08/17/2001     Influenza, seasonal,quad inj 6-35 mos 10/20/2004, 10/22/2005, 11/18/2006, 11/03/2007, 10/31/2008, 09/26/2009, 01/16/2012     Influenza,live, Nasal Laiv4 12/11/2014     MMR 11/10/1998, 08/08/2002     Meningococcal MCV4P 07/26/2016     Td,adult,historic,unspecified 08/17/2009     Tdap 08/17/2009     Varicella 11/10/1998, 08/17/2009       No family history on file.    Social History     Social History     Marital status: Single     Spouse name: N/A     Number of children: N/A     Years of education: N/A     Occupational History     Not on file.     Social History Main Topics     Smoking status: Never Smoker     Smokeless tobacco: Never Used      Comment: no exposure      Alcohol use Not on file     Drug use: Not on file     Sexual activity: Not on file     Other Topics Concern     Not on file     Social History Narrative    She will be starting college in [place] in the fall of 2016 for Chemical Engineering.       Last cholesterol:   Lab  "Results   Component Value Date    CHOL 160 03/04/2011     Lab Results   Component Value Date    HDL 52 03/04/2011     Lab Results   Component Value Date    LDLCALC 95 03/04/2011     Lab Results   Component Value Date    TRIG 68 03/04/2011     No components found for: CHOLHDL          Birth Control Method: pill  High Risk/Behavior: None      LMP: No LMP recorded.  Menstrual Regularity: 5/2/18  Flow: Normal      Review of Systems:   General:  Denies fever, chills, HA, fatigue, myalgias, weight change    Eyes: Denies vision changes   Ears/Nose/Throat: Denies nasal congestion, rhinorrhea, ear pain or discharge, sore throat, swollen glands  Cardiovascular: Denies CP, palpitations  Respiratory:  Denies SOB, cough  Gastrointestinal:  Denies changes in bowel habits, melena, rectal bleeding,  Genitourinary: Denies changes in urine habits/frequency/dysuria, hematuria   Musculoskeletal:  Denies  joint pain or swelling or erythema, edema  Skin: Denies rashes   Neurologic: Denies weakness, paresthesia  Psychiatric: Denies mood changes   Endocrine: Denies polyuria, polydipsia, polyphagia  Heme/Lymphatic: Denies problem with bleeding   Allergic/Immunologic: Denies problem     POSITIVES: 112 point review of systems is negative except for the following: Headaches, dizziness, lightheadedness, sensitivity to light, flashing lights, blurred vision, nasal discharge, nosebleeds, frequent sore throat, pain in the neck.  Dry cough, heart racing or skipping, leg pain with walking, nausea, vomiting, diarrhea, hemorrhoids, heartburn, muscle pain, muscle cramps, back pain, memory loss, tears, difficulty getting to sleep.      PHYSICAL EXAM:    /70  Pulse 71  Temp 98.4  F (36.9  C)  Ht 5' 8\" (1.727 m)  Wt 135 lb (61.2 kg)  LMP 05/02/2018  SpO2 99%  Breastfeeding? No  BMI 20.53 kg/m2    Wt Readings from Last 3 Encounters:   12/18/17 136 lb 9.6 oz (62 kg)   06/05/17 134 lb (60.8 kg) (60 %, Z= 0.26)*   05/14/17 135 lb 8 oz (61.5 kg) " (63 %, Z= 0.33)*     * Growth percentiles are based on [facility] 2-20 Years data.       Body mass index is 20.53 kg/(m^2).    Well developed, well nourished, no acute distress.  HEENT: normocephalic/atraumatic, PERRLA/EOMI, TMs: Gray, normal light reflex, no nasal discharge.  Oral mucosa: no erythema/exudate  Neck: No LAD/masses/thyromegaly/bruits  Lungs: clear bilaterally  Heart: regular rate and rhythm, no murmurs/gallops/rubs  Breasts: symmetric, no masses/skin changes, nipple discharge, or axillary LAD.  BSE reviewed.  Abdomen: Normal bowel sounds, soft, non-tender, non-distended, no masses, neg Figueredo's/McBurney's, no rebound/guarding  Genital: Exam deferred  Rectal: Exam deferred  Lymphatics: no supraclavicular/axillary/epitrochlear/inguinal LAD. No edema.  Neuro: A&O x 3, CN II-XII intact, strength 5/5, reflexes symmetric but brisk patellar, sensory intact to light touch.  Psych: Behavior appropriate, engaging.  Thought processes congruent, non-tangential.  Musculoskeletal: Extremities normal, atraumatic, no swelling  Skin: no rashes or lesions.      No results found for this or any previous visit (from the past 240 hour(s)).    ASSESSMENT/PLAN: 22 y.o. female physical exam and pap smear.          1. Routine general medical examination at a health care facility  Normal exam    2. Diplegic Cerebral Palsy With Spasticity  Patient states the physician she saw with her migraine was concerned regarding her patellar reflexes being so brisk.  I explained to her that that is because of her cerebral palsy.    3. Attention deficit hyperactivity disorder (ADHD), unspecified ADHD type  Does well on relatively low-dose Adderall.  - dextroamphetamine-amphetamine (ADDERALL XR) 20 MG 24 hr capsule; TAKE ONE CAPSULE BY MOUTH EVERY MORNING  Dispense: 90 capsule; Refill: 0    4. Anxiety  Stable    Medications Discontinued During This Encounter   Medication Reason     dextroamphetamine-amphetamine (ADDERALL XR) 20 MG 24 hr capsule  Reorder       Routine health maintenance discussion:  No smoking, limited alcohol (7 or less servings per week), 5 fruits/veg servings per day, 200 minutes of exercise per week.  Daily calcium/vitamin D guidelines, bone health, yearly mammogram after age 39/regular pap smears/colon cancer screening beginning at age 50.  Accident avoidance, sun screen.  Patient's immunizations are up-to-date.  We talked about travel and making sure her medications are in the proper bottles with the labels on them.    We also talked about the increased length of her migraine headache and whether further diagnostic testing can be done.  Most certainly a can but I think we have a reason for the prolonged headache because she did not have access to her pills right away.  If she has further problems she will let me know.  Will contact her with the results of the labs when available.    Alanna Oliva M.D.

## 2021-06-17 NOTE — TELEPHONE ENCOUNTER
Pt is currently living in [place], I explained that she needs an appt for this, unable to come in person for a visit. Also aware that we cannot give a rx over state lines for controlled substances.  She states her address is still [place] and wants to know if this would be okay? I said I doubt it, but will ask.  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  If we cannot do call or send a rx to her [place] address, wants to know what kind of provider she needs to find in [place] in order to get this rx.

## 2021-06-17 NOTE — TELEPHONE ENCOUNTER
Controlled Substance Refill Request  Medication Name:   Requested Prescriptions     Pending Prescriptions Disp Refills     dextroamphetamine-amphetamine (ADDERALL XR) 20 MG 24 hr capsule 30 capsule 0     Sig: Take 1 capsule (20 mg total) by mouth every morning.     Date Last Fill: 12/23/20  Requested Pharmacy: [facility]  Submit electronically to pharmacy  Controlled Substance Agreement on file:   Encounter-Level CSA Scan Date:    There are no encounter-level csa scan date.        Last office visit:  8/4/20

## 2021-06-19 NOTE — LETTER
Letter by Anais Sheikh NP at      Author: Anais Sheikh NP Service: -- Author Type: --    Filed:  Encounter Date: 5/14/2019 Status: (Other)         [facility] FAMILY MEDICINE/OB  05/14/19    Patient: Anjelica Ramirez  YOB: 1995  Medical Record Number: [id number]  CSN: [id number]                                                                              Non-opioid Controlled Substance Agreement    I understand that my care provider has prescribed a controlled substance to help manage my condition(s). I am taking this medicine to help me function or work. I know this is strong medicine, and that it can cause serious side effects. Controlled substances can be sedating, addicting and may cause a dependency on the drug. They can affect my ability to drive or think, and cause depression. They need to be taken exactly as prescribed. Combining controlled substances with certain medicines or chemicals (such as cocaine, sedatives and tranquilizers, sleeping pills, meth) can be dangerous or even fatal. Also, if I stop controlled substances suddenly, I may have severe withdrawal symptoms.  If not helpful, I may be asked to stop them.    The risks, benefits, and side effects of these medicine(s) were explained to me. I agree that:    1. I will take part in other treatments as advised by my care team. This may be psychiatry or counseling, physical therapy, behavioral therapy, group treatment or a referral to a pain clinic. I will reduce or stop my medicine when my care team tells me to do so.  2. I will take my medicines as prescribed. I will not change the dose or schedule unless my care team tells me to. There will be no refills if I run out early.  I may be contactedwithout warning and asked to complete a urine drug test or pill count at any time.   3. I will keep all my appointments, and understand this is part of the monitoring of controlled substances. My care team may require an office  visit for EVERY controlled substance refill. If I miss appointments or dont follow instructions, my care team may stop my medicine.  4. I will not ask other providers to prescribe controlled substances, and I will not accept controlled substances from other people. If I need another prescribed controlled substance for a new reason, I will tell my care team within 1 business day.  5. I will use one pharmacy to fill all of my controlled substance prescriptions, and it is up to me to make sure that I do not run out of my medicines on weekends or holidays. If my care team is willing to refill my controlled substance prescription without a visit, I must request refills only during office hours, refills may take up to 3 days to process, and it may take up to 5 to 7 days for my medicine to be mailed and ready at my pharmacy. Prescriptions will not be mailed anywhere except my pharmacy.    6. I am responsible for my prescriptions. If the medicine/prescription is lost or stolen, it will not be replaced. I also agree not to share controlled substance medicines with anyone.          [facility] FAMILY MEDICINE/OB  05/14/19  Patient:  Anjelica Ramirez  YOB: 1995  Medical Record Number: [id number]  CSN: [id number]    7. I agree to not use ANY illegal or recreational drugs. This includes marijuana, cocaine, bath salts or other drugs. I agree not to use alcohol unless my care team says I may. I agree to give urine samples whenever asked. If I dont give a urine sample, the care team may stop my medicine.    8. If I enroll in the [place] Medical Marijuana program, I will tell my care team. I will also sign an agreement to share my medical records with my care team.    9. I will bring in my list of medicines (or my medicine bottles) each time I come to the clinic.   10. I will tell my care team right away if I become pregnant or have a new medical problem treated outside of my regular clinic.  11. I understand that  this medicine can affect my thinking and judgment. It may be unsafe for me to drive, use machinery and do dangerous tasks. I will not do any of these things until I know how the medicine affects me. If my dose changes, I will wait to see how it affects me. I will contact my care team if I have concerns about medicine side effects.    I understand that if I do not follow any of the conditions above, my prescriptions or treatment may be stopped.      I agree that my provider, clinic care team, and pharmacy may work with any city, state or federal law enforcement agency that investigates the misuse, sale, or other diversion of my controlled medicine. I will allow my provider to discuss my care with or share a copy of this agreement with any other treating provider, pharmacy or emergency room where I receive care. I agree to give up (waive) any right of privacy or confidentiality with respect to these consents.   I have read this agreement and have asked questions about anything I did not understand.    ___________________________________________________________________________  Patient signature - Date/Time  -Anjelica Ramirez                                      ___________________________________________________________________________  Witness signature                                                                    ___________________________________________________________________________  Provider signature- Anais Sheikh NP

## 2021-06-19 NOTE — PROGRESS NOTES
PROGRESS NOTE       SUBJECTIVE:  Anjelica Ramirez is a 23 y.o. female   Chief Complaint   Patient presents with     Medication Refill     med check and refill    Anjelica is here for medication check regarding refill of her ADHD medication.  She has had a good summer and is looking forward to getting back to school.  She has no problems with her ADHD medication and has been on relatively low-dose for a long time.  Adderall XR 20 mg 1 daily.  #90 at a time.  This has worked out well because she goes to school in [place].  Her mother Mora keeps the supply at home and arranges the supply when visiting.  Patient has no concerns or questions regarding her medication.  I reviewed the ADHD treatment agreement in detail and form is completed and signed.    Patient's performance in college has been outstanding.  She plans to participate in ballet this year in hopes that it will help the bursitis in her hips.  She has seen orthopedics regarding this and the bursitis is related to her mild cerebral palsy.  Physical therapy is certainly an option but patient would like to try ballet first.    Patient Active Problem List   Diagnosis     Anxiety     Chronic Reflux Esophagitis     Allergies     Chronic Rhinitis     Acne     Joint Pain, Localized In The Knee     ADHD, Predominantly Inattentive Type     Diplegic Cerebral Palsy With Spasticity     Sleep Disturbances     Fatigue     Memory Lapses Or Loss     Abnormal involuntary movement     Allergic Rhinitis     Headache     Controlled substance agreement signed       Current Outpatient Prescriptions   Medication Sig Dispense Refill     ADDERALL XR 20 mg 24 hr capsule TAKE ONE CAPSULE BY MOUTH EVERY MORNING 90 capsule 0     cetirizine (ZYRTEC) 10 MG tablet Take 10 mg by mouth daily.       esomeprazole (NEXIUM) 40 MG capsule TAKE 1 CAPSULE EVERY MORNING BEFORE BREAKFAST 90 capsule 3     ESTARYLLA 0.25-35 mg-mcg per tablet TAKE 1 TABLET DAILY 84 tablet 3     MULTIVIT &MINERALS/FERROUS FUM  (MULTI VITAMIN ORAL) Take by mouth.       fluticasone (FLONASE) 50 mcg/actuation nasal spray INSTILL ONE SPRAY INTO BOTH NOSTRILS ONCE DAILY FOR 30 DAYS.  0     ibuprofen (ADVIL,MOTRIN) 600 MG tablet Take 1 tablet (600 mg total) by mouth daily as needed for pain (with food). 60 tablet 2     mometasone (NASONEX) 50 mcg/actuation nasal spray 2 sprays into each nostril 2 (two) times a day.       SUMAtriptan (IMITREX) 25 MG tablet Take 1 tablet (25 mg total) by mouth every 2 (two) hours as needed for migraine. 10 tablet 3     No current facility-administered medications for this visit.        History   Smoking Status     Never Smoker   Smokeless Tobacco     Never Used     Comment: no exposure        REVIEW OF SYSTEMS:  Patient denies fever, chills, dizziness, headache, visual change, cough, chest pain, shortness of breath, abdominal pain, extremity pain or swelling.          OBJECTIVE:       Vitals:    08/13/18 1002   BP: 115/62   Pulse: 76     Weight: 140 lb (63.5 kg)  Wt Readings from Last 3 Encounters:   08/13/18 140 lb (63.5 kg)   05/07/18 135 lb (61.2 kg)   12/18/17 136 lb 9.6 oz (62 kg)     Body mass index is 21.29 kg/(m^2).        Physical Exam:  GENERAL APPEARANCE: A&A, NAD, well hydrated, well nourished  Otherwise not examined  PSYCHIATRIC:  Mood appropriate, memory intact.  Patient has good eye contact and is socially appropriate and has tended to self cares.  She has a confident defect.        ASSESSMENT/PLAN:     1. Attention deficit disorder  We will continue Adderall X are 20 mg 1 daily #90.  Patient agrees to schedule for follow-up every 6 months minimally.    2. Controlled substance agreement signed      3. Diplegic Cerebral Palsy With Spasticity  Patient will let me know if she needs an order for physical therapy.    There are no Patient Instructions on file for this visit.  Medications Discontinued During This Encounter   Medication Reason     dextroamphetamine-amphetamine (ADDERALL XR) 20 MG 24 hr  capsule Duplicate order     Return in about 6 months (around 2/13/2019) for ADHD.    The visit lasted a total of 27 minutes face to face with the patient.  Over 50% of the time spent counseling and educating the patient about all of the above.      Alanna Oliva MD

## 2021-06-20 NOTE — LETTER
Letter by Lacey Gabriel MD at      Author: Lacey Gabriel MD Service: -- Author Type: --    Filed:  Encounter Date: 3/10/2020 Status: (Other)         March 10, 2020     Patient: Anjelica Ramirez   YOB: 1995   Date of Visit: 3/10/2020       To Whom it May Concern:    Anjelica Ramirez was seen in my clinic on 3/10/2020.    If you have any questions or concerns, please don't hesitate to call.    Sincerely,         Electronically signed by Lacey Gabriel MD

## 2021-06-20 NOTE — LETTER
Letter by Lacey Gabriel MD at      Author: Lacey Gabriel MD Service: -- Author Type: --    Filed:  Encounter Date: 3/10/2020 Status: (Other)         [facility] FAMILY MEDICINE/OB  03/10/20    Patient: Anjelica Ramirez  YOB: 1995  Medical Record Number: [id number]  CSN: [id number]                                                                              Non-opioid Controlled Substance Agreement    I understand that my care provider has prescribed a controlled substance to help manage my condition(s). I am taking this medicine to help me function or work. I know this is strong medicine, and that it can cause serious side effects. Controlled substances can be sedating, addicting and may cause a dependency on the drug. They can affect my ability to drive or think, and cause depression. They need to be taken exactly as prescribed. Combining controlled substances with certain medicines or chemicals (such as cocaine, sedatives and tranquilizers, sleeping pills, meth) can be dangerous or even fatal. Also, if I stop controlled substances suddenly, I may have severe withdrawal symptoms.  If not helpful, I may be asked to stop them.    The risks, benefits, and side effects of these medicine(s) were explained to me. I agree that:    1. I will take part in other treatments as advised by my care team. This may be psychiatry or counseling, physical therapy, behavioral therapy, group treatment or a referral to a pain clinic. I will reduce or stop my medicine when my care team tells me to do so.  2. I will take my medicines as prescribed. I will not change the dose or schedule unless my care team tells me to. There will be no refills if I run out early.  I may be contactedwithout warning and asked to complete a urine drug test or pill count at any time.   3. I will keep all my appointments, and understand this is part of the monitoring of controlled substances. My care team may require  an office visit for EVERY controlled substance refill. If I miss appointments or dont follow instructions, my care team may stop my medicine.  4. I will not ask other providers to prescribe controlled substances, and I will not accept controlled substances from other people. If I need another prescribed controlled substance for a new reason, I will tell my care team within 1 business day.  5. I will use one pharmacy to fill all of my controlled substance prescriptions, and it is up to me to make sure that I do not run out of my medicines on weekends or holidays. If my care team is willing to refill my controlled substance prescription without a visit, I must request refills only during office hours, refills may take up to 3 days to process, and it may take up to 5 to 7 days for my medicine to be mailed and ready at my pharmacy. Prescriptions will not be mailed anywhere except my pharmacy.    6. I am responsible for my prescriptions. If the medicine/prescription is lost or stolen, it will not be replaced. I also agree not to share controlled substance medicines with anyone.          [facility] FAMILY MEDICINE/OB  03/10/20  Patient:  Anjelica Ramirez  YOB: 1995  Medical Record Number: [id number]  CSN: [id number]    7. I agree to not use ANY illegal or recreational drugs. This includes marijuana, cocaine, bath salts or other drugs. I agree not to use alcohol unless my care team says I may. I agree to give urine samples whenever asked. If I dont give a urine sample, the care team may stop my medicine.    8. If I enroll in the [place] Medical Marijuana program, I will tell my care team. I will also sign an agreement to share my medical records with my care team.    9. I will bring in my list of medicines (or my medicine bottles) each time I come to the clinic.   10. I will tell my care team right away if I become pregnant or have a new medical problem treated outside of my regular clinic.  11. I  understand that this medicine can affect my thinking and judgment. It may be unsafe for me to drive, use machinery and do dangerous tasks. I will not do any of these things until I know how the medicine affects me. If my dose changes, I will wait to see how it affects me. I will contact my care team if I have concerns about medicine side effects.    I understand that if I do not follow any of the conditions above, my prescriptions or treatment may be stopped.      I agree that my provider, clinic care team, and pharmacy may work with any city, state or federal law enforcement agency that investigates the misuse, sale, or other diversion of my controlled medicine. I will allow my provider to discuss my care with or share a copy of this agreement with any other treating provider, pharmacy or emergency room where I receive care. I agree to give up (waive) any right of privacy or confidentiality with respect to these consents.   I have read this agreement and have asked questions about anything I did not understand.    ___________________________________________________________________________  Patient signature - Date/Time  -Anjelica Ramirez                                      ___________________________________________________________________________  Witness signature                                                                    ___________________________________________________________________________  Provider signature- Lacey Gabriel MD

## 2021-06-21 NOTE — PROGRESS NOTES
PROGRESS NOTE       SUBJECTIVE:  Anjelica Ramirez is a 23 y.o. female   Chief Complaint   Patient presents with     Follow-up     hips hurting more than usual, not sleeping well     Previous states that she just has not been feeling right.  Her legs hurt her and she is not sleeping.  It has been a very happy semester for her and she is enjoyed ballet.  She joined this thinking that it would help her hips and instead it makes her legs ache.  Her anxiety has been good and so the exercise has helped.  She is not getting specific muscle cramps is not specific to the joints her legs just ache.    Patient Active Problem List   Diagnosis     Anxiety     Chronic Reflux Esophagitis     Allergies     Chronic Rhinitis     Acne     Joint Pain, Localized In The Knee     ADHD, Predominantly Inattentive Type     Diplegic Cerebral Palsy With Spasticity     Sleep Disturbances     Fatigue     Memory Lapses Or Loss     Abnormal involuntary movement     Allergic Rhinitis     Headache     Controlled substance agreement signed       Current Outpatient Prescriptions   Medication Sig Dispense Refill     ADDERALL XR 20 mg 24 hr capsule TAKE ONE CAPSULE BY MOUTH EVERY MORNING 90 capsule 0     cetirizine (ZYRTEC) 10 MG tablet Take 10 mg by mouth daily.       esomeprazole (NEXIUM) 40 MG capsule TAKE 1 CAPSULE EVERY MORNING BEFORE BREAKFAST 90 capsule 3     ESTARYLLA 0.25-35 mg-mcg per tablet TAKE 1 TABLET DAILY 84 tablet 3     fluticasone (FLONASE) 50 mcg/actuation nasal spray INSTILL ONE SPRAY INTO BOTH NOSTRILS ONCE DAILY FOR 30 DAYS.  0     ibuprofen (ADVIL,MOTRIN) 600 MG tablet Take 1 tablet (600 mg total) by mouth daily as needed for pain (with food). 60 tablet 2     MULTIVIT &MINERALS/FERROUS FUM (MULTI VITAMIN ORAL) Take by mouth.       SUMAtriptan (IMITREX) 25 MG tablet Take 1 tablet (25 mg total) by mouth every 2 (two) hours as needed for migraine. 10 tablet 3     mometasone (NASONEX) 50 mcg/actuation nasal spray 2 sprays into each  nostril 2 (two) times a day.       No current facility-administered medications for this visit.        History   Smoking Status     Never Smoker   Smokeless Tobacco     Never Used     Comment: no exposure        REVIEW OF SYSTEMS:  Patient denies fever, chills, dizziness, headache, visual change, ear pain, cough, chest pain, shortness of breath, abdominal pain, extremity pain or swelling, rash,  depression or anxiety.          OBJECTIVE:       Vitals:    10/25/18 1302   BP: 108/58   Pulse: (!) 102   SpO2: 99%     Weight: 139 lb 1 oz (63.1 kg)  Wt Readings from Last 3 Encounters:   10/25/18 139 lb 1 oz (63.1 kg)   08/13/18 140 lb (63.5 kg)   05/07/18 135 lb (61.2 kg)     Body mass index is 21.14 kg/(m^2).        Physical Exam:  GENERAL APPEARANCE: A&A, NAD, well hydrated, well nourished  NECK: Supple, without lymphadenopathy, no thyroid mass  CV: RRR, no M/G/R   LUNGS: CTAB, normal respiratory effort  EXTREMITY: Extremities normal, atraumatic, no swelling.    NEURO: no gross deficits   PSYCHIATRIC:  Mood appropriate, memory intact        ASSESSMENT/PLAN:     1. Diplegic Cerebral Palsy With Spasticity    Now with leg pain bilateral.  I instructed her on how to stretch the IT band which she thinks she is probably already doing in dance class.  My thought is to begin gabapentin 100 mg about an hour before bedtime.  She can increase this by 100 mg increments until she gets the results we want: Tolerable pain and good sleep.  Gabapentin helps with both and I think it is the best choice for her at this time without being habit forming.  I am anticipating that 3-400 mg at night will do the job.  Patient agrees with the plan and will keep me posted on how its going.      I spent a total of 28 minutes face to face with the patient.  Over 50% of the time spent counseling and educating the patient about all of the above.      Alanna Oliva MD

## 2021-06-22 NOTE — PROGRESS NOTES
Anjelica Ramirez is a 23 y.o. female is here for a  Health Maintenance exam. Patient is overall doing well.  Julia is here for a physical exam.  She is not sexually active and really is not familiar with Pap smears.  I explained to her that these are cancer screening tests of the cervix which can prevent her from getting cancer some day by treating cervical changes early.  She most certainly is not at high risk for this given that she is not sexually active and she has had her HPV vaccines.  Her questions were answered.      Otherwise she has been doing very well in school and enjoys studying the chemistry of foods.  She has an internship planned with [employer] that she is very excited about.    Healthy Habits:   Regular Exercise: Yes  Sunscreen Use: Yes  Healthy Diet: Yes  Dental Visits Regularly: Yes  Seat Belt: Yes  Sexually active: No  Self Breast Exam Monthly:Yes  Hemoccults: No  Flex Sig: No  Colonoscopy: No  Lipid Profile: N/A  Glucose Screen: N/A  Prevention of Osteoporosis: Yes  Last Dexa: No  Guns at Home:  No  Domestic Violence:  No    Current Outpatient Medications Include:    Current Outpatient Medications:      ADDERALL XR 20 mg 24 hr capsule, TAKE ONE CAPSULE BY MOUTH EVERY MORNING, Disp: 90 capsule, Rfl: 0     cetirizine (ZYRTEC) 10 MG tablet, Take 10 mg by mouth daily., Disp: , Rfl:      esomeprazole (NEXIUM) 40 MG capsule, TAKE 1 CAPSULE EVERY MORNING BEFORE BREAKFAST, Disp: 90 capsule, Rfl: 3     ESTARYLLA 0.25-35 mg-mcg per tablet, TAKE 1 TABLET DAILY, Disp: 84 tablet, Rfl: 2     fluticasone (FLONASE) 50 mcg/actuation nasal spray, INSTILL ONE SPRAY INTO BOTH NOSTRILS ONCE DAILY FOR 30 DAYS., Disp: , Rfl: 0     gabapentin (NEURONTIN) 100 MG capsule, Take 1-4 caps by mouth before bedtime., Disp: 360 capsule, Rfl: 0     MULTIVIT &MINERALS/FERROUS FUM (MULTI VITAMIN ORAL), Take by mouth., Disp: , Rfl:      ibuprofen (ADVIL,MOTRIN) 600 MG tablet, Take 1 tablet (600 mg total) by mouth daily as needed for  pain (with food)., Disp: 60 tablet, Rfl: 2     SUMAtriptan (IMITREX) 25 MG tablet, Take 1 tablet (25 mg total) by mouth every 2 (two) hours as needed for migraine., Disp: 10 tablet, Rfl: 3    Allergies:  No Known Allergies    Past Medical History:   Diagnosis Date     Cerebral palsy with spastic/ataxic diplegia     very mild, has had botox injections for toe walking     Mild intermittent asthma     none since very young       Past Surgical History:   Procedure Laterality Date     SKIN LESION EXCISION      benign mole     TONSILLECTOMY AND ADENOIDECTOMY  age 9       OB History    Para Term  AB Living   0 0 0 0 0 0   SAB TAB Ectopic Multiple Live Births   0 0 0 0 0             Immunization History   Administered Date(s) Administered     DTaP, historic 1997, 1997, 1998, 11/10/1998, 2001     HPV Quadrivalent 2012, 2012, 2012     Hep A, historic 2009, 2012     Hep B, historic 1997, 1997, 1998     HiB, historic,unspecified 1997, 1997, 1998, 11/10/1998     IPV 1997, 1997, 1998, 2001     Influenza, Seasonal, Inj PF IIV3 2015, 10/12/2016     Influenza, inj, historic,unspecified 2017     Influenza, seasonal,quad inj 6-35 mos 10/20/2004, 10/22/2005, 2006, 2007, 10/31/2008, 2009, 2012     Influenza,live, Nasal Laiv4 2014     Influenza,seasonal, Inj IIV3 2012, 2013     MMR 11/10/1998, 2002     Meningococcal MCV4P 2016, 2018     Td,adult,historic,unspecified 2009     Tdap 2009     Varicella 11/10/1998, 2009       Family History   Problem Relation Age of Onset     No Medical Problems Mother      No Medical Problems Brother      No Medical Problems Brother        Social History     Socioeconomic History     Marital status: Single     Spouse name: Not on file     Number of children: Not on file     Years of education:  Not on file     Highest education level: Not on file   Social Needs     Financial resource strain: Not on file     Food insecurity - worry: Not on file     Food insecurity - inability: Not on file     Transportation needs - medical: Not on file     Transportation needs - non-medical: Not on file   Occupational History     Not on file   Tobacco Use     Smoking status: Never Smoker     Smokeless tobacco: Never Used     Tobacco comment: no exposure    Substance and Sexual Activity     Alcohol use: No     Drug use: No     Sexual activity: No   Other Topics Concern     Not on file   Social History Narrative    She will be starting college in [place] in the fall of 2016 for Chemical Engineering.       Last cholesterol:   Lab Results   Component Value Date    CHOL 160 03/04/2011     Lab Results   Component Value Date    HDL 52 03/04/2011     Lab Results   Component Value Date    LDLCALC 95 03/04/2011     Lab Results   Component Value Date    TRIG 68 03/04/2011     No components found for: CHOLHDL          Birth Control Method: Patient takes oral contraceptives to manage and regulate her cycles.  She states this works well for her.  High Risk/Behavior: None      LMP: Patient's last menstrual period was 12/24/2018.  Menstrual Regularity: Monthly  Flow: 5 days      Review of Systems:   General:  Denies fever, chills, HA, fatigue, myalgias, weight change    Eyes: Denies vision changes   Ears/Nose/Throat: Denies nasal congestion, rhinorrhea, ear pain or discharge, sore throat, swollen glands  Cardiovascular: Denies CP, palpitations  Respiratory:  Denies SOB, cough  Gastrointestinal:  Denies changes in bowel habits, melena, rectal bleeding,  Genitourinary: Denies changes in urine habits/frequency/dysuria, hematuria   Musculoskeletal:  Denies  joint pain or swelling or erythema, edema  Skin: Denies rashes   Neurologic: Denies weakness, paresthesia  Psychiatric: Denies mood changes   Endocrine: Denies polyuria, polydipsia,  "polyphagia  Heme/Lymphatic: Denies problem with bleeding   Allergic/Immunologic: Denies problem     POSITIVES: Dry skin, headaches, lightheadedness, ringing in ears, blurred vision, nasal discharge, frequent colds, loss of smell, frequent nosebleeds, neck pain, cough, diarrhea, food intolerance, occasional hemorrhoid bleeding, heartburn, muscle pain, joint pain, weakness, muscle cramps, back pain, restless legs, tremor, difficulty getting to sleep, cloudy urine.      PHYSICAL EXAM:    /64   Pulse 82   Temp 98.8  F (37.1  C)   Ht 5' 8\" (1.727 m)   Wt 140 lb (63.5 kg)   LMP 12/24/2018   SpO2 99%   Breastfeeding? No   BMI 21.29 kg/m      Wt Readings from Last 3 Encounters:   10/25/18 139 lb 1 oz (63.1 kg)   08/13/18 140 lb (63.5 kg)   05/07/18 135 lb (61.2 kg)       Body mass index is 21.29 kg/m .    Well developed, well nourished, no acute distress.  HEENT: normocephalic/atraumatic, PERRLA/EOMI, TMs: Gray, normal light reflex, no nasal discharge.  Oral mucosa: no erythema/exudate  Neck: No LAD/masses/thyromegaly/bruits  Lungs: clear bilaterally  Heart: regular rate and rhythm, no murmurs/gallops/rubs  Breasts: symmetric, no masses/skin changes, nipple discharge, or axillary LAD.  BSE reviewed.  Abdomen: Normal bowel sounds, soft, non-tender, non-distended, no masses, neg Figueredo's/McBurney's, no rebound/guarding  Genital: Normal external genitalia, no discharge, no lesions.  On exam of the introital opening the hymen is prominent and very tender to touch.  Patient states that she is never been able to use tampons because of pain.  The opening to the vagina is small, I was unable to do further exam.  Thin prep not done.    Rectal: internal exam is deferred.  External exam is normal.  Lymphatics: no supraclavicular/axillary/epitrochlear/inguinal LAD. No edema.  Neuro: A&O x 3, CN II-XII intact, strength 5/5, reflexes symmetric, but hyperdynamic, sensory intact to light touch.  Psych: Behavior appropriate, " engaging.  Thought processes congruent, non-tangential.  Musculoskeletal: Extremities normal, atraumatic, no swelling  Skin: no rashes or lesions.        ASSESSMENT/PLAN: 23 y.o. female physical exam and pap smear.        There are no diagnoses linked to this encounter.    Medications Discontinued During This Encounter   Medication Reason     mometasone (NASONEX) 50 mcg/actuation nasal spray Duplicate order       Routine health maintenance discussion:  No smoking, limited alcohol (7 or less servings per week), 5 fruits/veg servings per day, 200 minutes of exercise per week.  Daily calcium/vitamin D guidelines, bone health, yearly mammogram after age 39/regular pap smears/colon cancer screening beginning at age 50.  Accident avoidance, sun screen.      Will contact her with the results of the labs when available.    Rain Askew M.D.

## 2021-06-23 NOTE — TELEPHONE ENCOUNTER
RN cannot approve Refill Request    RN can NOT refill this medication med is not covered by policy/route to provider.    Moiz Flower, [facility] Triage/Med Refill 1/24/2019    Requested Prescriptions   Pending Prescriptions Disp Refills      mg tablet [Pharmacy Med Name: IBUPROFEN (IBU) TABS 600MG] 60 tablet 2     Sig: TAKE 1 TABLET DAILY WITH FOOD AS NEEDED FOR PAIN    There is no refill protocol information for this order

## 2021-06-25 NOTE — TELEPHONE ENCOUNTER
RN cannot approve Refill Request    RN can NOT refill this medication historical medication requested and medication not on med list. Last office visit: 10/25/2018 Alanna Oliva MD Last Physical: 1/9/2019 Last MTM visit: Visit date not found Last visit same specialty: 8/4/2020 Lacey Gabriel MD.  Next visit within 3 mo: Visit date not found  Next physical within 3 mo: Visit date not found      Alanna Thornton, [facility] Triage/Med Refill 5/27/2021    Requested Prescriptions   Pending Prescriptions Disp Refills     esomeprazole (NEXIUM) 40 MG capsule [Pharmacy Med Name: ESOMEPRAZOLE MAGNESIUM DR CAPS 40MG] 90 capsule 3     Sig: TAKE 1 CAPSULE EVERY MORNING BEFORE BREAKFAST       GI Medications Refill Protocol Passed - 5/27/2021 12:37 AM        Passed - PCP or prescribing provider visit in last 12 or next 3 months.     Last office visit with prescriber/PCP: 10/25/2018 Alanna Oliva MD OR same dept: 8/4/2020 Lacey Gabriel MD OR same specialty: 8/4/2020 Lacey Gabriel MD  Last physical: 1/9/2019 Last MTM visit: Visit date not found   Next visit within 3 mo: Visit date not found  Next physical within 3 mo: Visit date not found  Prescriber OR PCP: Alanna Oliva MD  Last diagnosis associated with med order: 1. Chronic Reflux Esophagitis  - esomeprazole (NEXIUM) 40 MG capsule [Pharmacy Med Name: ESOMEPRAZOLE MAGNESIUM DR CAPS 40MG]; TAKE 1 CAPSULE EVERY MORNING BEFORE BREAKFAST  Dispense: 90 capsule; Refill: 3    If protocol passes may refill for 12 months if within 3 months of last provider visit (or a total of 15 months).

## 2021-07-03 NOTE — ADDENDUM NOTE
Addendum Note by Lacey Ovalles MD at 7/3/2020 10:55 AM     Author: Lacey Ovalles MD Service: -- Author Type: Physician    Filed: 7/3/2020 11:04 AM Encounter Date: 7/3/2020 Status: Signed    : Lacey Ovalles MD (Physician)    Addended by: LACEY OVALLES on: 7/3/2020 11:04 AM        Modules accepted: Orders

## 2021-07-10 ENCOUNTER — COMMUNICATION - HEALTHEAST (OUTPATIENT)
Dept: FAMILY MEDICINE | Facility: CLINIC | Age: 26
End: 2021-07-10

## 2021-07-10 DIAGNOSIS — F90.9 ATTENTION DEFICIT HYPERACTIVITY DISORDER (ADHD), UNSPECIFIED ADHD TYPE: ICD-10-CM

## 2021-07-11 DIAGNOSIS — F90.9 ATTENTION DEFICIT HYPERACTIVITY DISORDER (ADHD), UNSPECIFIED ADHD TYPE: Primary | ICD-10-CM

## 2021-07-12 NOTE — TELEPHONE ENCOUNTER
Medication: Adderall XR 20mg  Last Date Filled 5/7/21  Last appointment addressing medication use: August 2020      Taken as prescribed from physician notes?     CSA in last year: NO    Random Utox in last year: NO  (if any of the above answer NO - schedule with PCP)     Opioids + benzodiazepines? NO  (if the above answer YES - schedule with PCP every 6 months)       All responses suggest: Scheduling with PCP for further intervention

## 2021-07-12 NOTE — TELEPHONE ENCOUNTER
Telephone Encounter by Shae Cao RN at 7/11/2021  7:57 PM     Author: Shae Cao RN Service: -- Author Type: Registered Nurse    Filed: 7/11/2021  7:57 PM Encounter Date: 7/10/2021 Status: Signed    : Shae Cao RN (Registered Nurse)       RN cannot approve Refill Request    RN can NOT refill this medication Protocol failed and NO refill given. Last office visit: 8/4/2020 Lacey Gabriel MD Last Physical: Visit date not found Last MTM visit: Visit date not found Last visit same specialty: 8/4/2020 Lacey Gabreil MD.  Next visit within 3 mo: Visit date not found  Next physical within 3 mo: Visit date not found      [facility] Triage/Med Refill 7/11/2021    Requested Prescriptions   Pending Prescriptions Disp Refills   ? dextroamphetamine-amphetamine (ADDERALL XR) 20 MG 24 hr capsule 30 capsule 0     Sig: Take 1 capsule (20 mg total) by mouth every morning.       Controlled Substances Refill Protocol Failed - 7/10/2021  2:18 PM        Failed - Route all Controlled Substance Requests to Provider        Passed - Patient has controlled substance agreement in past 12 months     Encounter-Level CSA Scan Date:    There are no encounter-level csa scan date.               Passed - Visit with PCP or prescribing provider visit in past 12 months      Last office visit with prescriber/PCP: 8/4/2020 Lacey Gabriel MD OR same dept: 8/4/2020 Lacey Gabriel MD OR same specialty: 8/4/2020 Lacey Gabriel MD Last physical: Visit date not found Last MTM visit: Visit date not found    Next visit within 3 mo: Visit date not found  Next physical within 3 mo: Visit date not found  Prescriber OR PCP: Lacey Gabriel MD  Last diagnosis associated with med order: 1. Attention deficit hyperactivity disorder (ADHD), unspecified ADHD type  - dextroamphetamine-amphetamine (ADDERALL XR) 20 MG 24 hr capsule; Take 1 capsule (20 mg  total) by mouth every morning.  Dispense: 30 capsule; Refill: 0                       As part of the required manual data conversion process for integration, this encounter was created to document a refill encounter. This information was copied from the [facility] patient's chart to the [facility] patient chart.     Shae Cao

## 2021-07-12 NOTE — TELEPHONE ENCOUNTER
RN cannot approve Refill Request    RN can NOT refill this medication Protocol failed and NO refill given. Last office visit: 8/4/2020 Lacey Gabriel MD Last Physical: Visit date not found Last MTM visit: Visit date not found Last visit same specialty: 8/4/2020 Lacey Gabriel MD.  Next visit within 3 mo: Visit date not found  Next physical within 3 mo: Visit date not found      Shae Cao, [facility] Triage/Med Refill 7/11/2021    Requested Prescriptions   Pending Prescriptions Disp Refills     dextroamphetamine-amphetamine (ADDERALL XR) 20 MG 24 hr capsule 30 capsule 0     Sig: Take 1 capsule (20 mg total) by mouth every morning.       Controlled Substances Refill Protocol Failed - 7/10/2021  2:18 PM        Failed - Route all Controlled Substance Requests to Provider        Passed - Patient has controlled substance agreement in past 12 months     Encounter-Level CSA Scan Date:    There are no encounter-level csa scan date.               Passed - Visit with PCP or prescribing provider visit in past 12 months      Last office visit with prescriber/PCP: 8/4/2020 Lacey Gabriel MD OR same dept: 8/4/2020 Lacey Gabriel MD OR same specialty: 8/4/2020 Lacey Gabriel MD Last physical: Visit date not found Last MTM visit: Visit date not found    Next visit within 3 mo: Visit date not found  Next physical within 3 mo: Visit date not found  Prescriber OR PCP: Lacey Gabriel MD  Last diagnosis associated with med order: 1. Attention deficit hyperactivity disorder (ADHD), unspecified ADHD type  - dextroamphetamine-amphetamine (ADDERALL XR) 20 MG 24 hr capsule; Take 1 capsule (20 mg total) by mouth every morning.  Dispense: 30 capsule; Refill: 0                       As part of the required manual data conversion process for integration, this encounter was created to document a refill encounter. This information was copied from the [facility] patient's chart to the [facility] patient chart.     Shae Cao

## 2021-07-13 RX ORDER — DEXTROAMPHETAMINE SACCHARATE, AMPHETAMINE ASPARTATE MONOHYDRATE, DEXTROAMPHETAMINE SULFATE AND AMPHETAMINE SULFATE 5; 5; 5; 5 MG/1; MG/1; MG/1; MG/1
20 CAPSULE, EXTENDED RELEASE ORAL EVERY MORNING
Qty: 30 CAPSULE | Refills: 0 | Status: SHIPPED | OUTPATIENT
Start: 2021-07-13 | End: 2021-08-09

## 2021-08-02 ENCOUNTER — OFFICE VISIT (OUTPATIENT)
Dept: FAMILY MEDICINE | Facility: CLINIC | Age: 26
End: 2021-08-02
Payer: COMMERCIAL

## 2021-08-02 VITALS
WEIGHT: 136 LBS | TEMPERATURE: 98.2 F | OXYGEN SATURATION: 98 % | HEIGHT: 68 IN | DIASTOLIC BLOOD PRESSURE: 68 MMHG | HEART RATE: 75 BPM | BODY MASS INDEX: 20.61 KG/M2 | SYSTOLIC BLOOD PRESSURE: 100 MMHG

## 2021-08-02 DIAGNOSIS — Z00.00 ANNUAL PHYSICAL EXAM: Primary | ICD-10-CM

## 2021-08-02 DIAGNOSIS — F90.9 ATTENTION DEFICIT HYPERACTIVITY DISORDER (ADHD), UNSPECIFIED ADHD TYPE: ICD-10-CM

## 2021-08-02 DIAGNOSIS — K21.00 GASTROESOPHAGEAL REFLUX DISEASE WITH ESOPHAGITIS WITHOUT HEMORRHAGE: ICD-10-CM

## 2021-08-02 DIAGNOSIS — Z79.899 CONTROLLED SUBSTANCE AGREEMENT SIGNED: ICD-10-CM

## 2021-08-02 DIAGNOSIS — Z71.89 ACP (ADVANCE CARE PLANNING): ICD-10-CM

## 2021-08-02 DIAGNOSIS — G43.111 INTRACTABLE MIGRAINE WITH AURA WITH STATUS MIGRAINOSUS: ICD-10-CM

## 2021-08-02 DIAGNOSIS — Z86.39 HISTORY OF IRON DEFICIENCY: ICD-10-CM

## 2021-08-02 DIAGNOSIS — G80.9 CEREBRAL PALSY, UNSPECIFIED TYPE (H): ICD-10-CM

## 2021-08-02 LAB
AMPHETAMINES UR QL SCN: NORMAL
ANION GAP SERPL CALCULATED.3IONS-SCNC: 10 MMOL/L (ref 5–18)
BARBITURATES UR QL: NORMAL
BENZODIAZ UR QL: NORMAL
BUN SERPL-MCNC: 8 MG/DL (ref 8–22)
CALCIUM SERPL-MCNC: 9.6 MG/DL (ref 8.5–10.5)
CANNABINOIDS UR QL SCN: NORMAL
CHLORIDE BLD-SCNC: 105 MMOL/L (ref 98–107)
CHOLEST SERPL-MCNC: 208 MG/DL
CO2 SERPL-SCNC: 23 MMOL/L (ref 22–31)
COCAINE UR QL: NORMAL
CREAT SERPL-MCNC: 0.69 MG/DL (ref 0.6–1.1)
CREAT UR-MCNC: 139 MG/DL
FASTING STATUS PATIENT QL REPORTED: YES
FERRITIN SERPL-MCNC: 48 NG/ML (ref 10–130)
GFR SERPL CREATININE-BSD FRML MDRD: >90 ML/MIN/1.73M2
GLUCOSE BLD-MCNC: 80 MG/DL (ref 70–125)
HDLC SERPL-MCNC: 56 MG/DL
HGB BLD-MCNC: 12.5 G/DL (ref 11.7–15.7)
HIV 1+2 AB+HIV1 P24 AG SERPL QL IA: NEGATIVE
LDLC SERPL CALC-MCNC: 130 MG/DL
OPIATES UR QL SCN: NORMAL
OXYCODONE UR QL: NORMAL
PCP UR QL SCN: NORMAL
POTASSIUM BLD-SCNC: 4.3 MMOL/L (ref 3.5–5)
SODIUM SERPL-SCNC: 138 MMOL/L (ref 136–145)
TRIGL SERPL-MCNC: 108 MG/DL

## 2021-08-02 PROCEDURE — 87389 HIV-1 AG W/HIV-1&-2 AB AG IA: CPT | Performed by: NURSE PRACTITIONER

## 2021-08-02 PROCEDURE — 80307 DRUG TEST PRSMV CHEM ANLYZR: CPT | Performed by: NURSE PRACTITIONER

## 2021-08-02 PROCEDURE — 36415 COLL VENOUS BLD VENIPUNCTURE: CPT | Performed by: NURSE PRACTITIONER

## 2021-08-02 PROCEDURE — 82728 ASSAY OF FERRITIN: CPT | Performed by: NURSE PRACTITIONER

## 2021-08-02 PROCEDURE — 82306 VITAMIN D 25 HYDROXY: CPT | Performed by: NURSE PRACTITIONER

## 2021-08-02 PROCEDURE — 86803 HEPATITIS C AB TEST: CPT | Performed by: NURSE PRACTITIONER

## 2021-08-02 PROCEDURE — 99214 OFFICE O/P EST MOD 30 MIN: CPT | Mod: 25 | Performed by: NURSE PRACTITIONER

## 2021-08-02 PROCEDURE — 80048 BASIC METABOLIC PNL TOTAL CA: CPT | Performed by: NURSE PRACTITIONER

## 2021-08-02 PROCEDURE — 85018 HEMOGLOBIN: CPT | Performed by: NURSE PRACTITIONER

## 2021-08-02 PROCEDURE — 80061 LIPID PANEL: CPT | Performed by: NURSE PRACTITIONER

## 2021-08-02 PROCEDURE — 99395 PREV VISIT EST AGE 18-39: CPT | Performed by: NURSE PRACTITIONER

## 2021-08-02 RX ORDER — FAMOTIDINE 40 MG/1
40 TABLET, FILM COATED ORAL EVERY EVENING
Qty: 90 TABLET | Refills: 3 | Status: SHIPPED | OUTPATIENT
Start: 2021-08-02 | End: 2022-07-11

## 2021-08-02 ASSESSMENT — MIFFLIN-ST. JEOR: SCORE: 1420.39

## 2021-08-02 NOTE — PROGRESS NOTES
SUBJECTIVE:   CC: Anjelica Ramirez is an 25 year old woman who presents for preventive health visit and med check. PCP Harvey who is out of office today.       Patient has been advised of split billing requirements and indicates understanding: Yes  Healthy Habits:     Getting at least 3 servings of Calcium per day:  Yes    Bi-annual eye exam:  Yes    Dental care twice a year:  Yes    Sleep apnea or symptoms of sleep apnea:  Daytime drowsiness    Diet:  Regular (no restrictions)    Frequency of exercise:  1 day/week    Duration of exercise:  15-30 minutes    Taking medications regularly:  Yes    Medication side effects:  None    PHQ-2 Total Score: 0    Additional concerns today:  Yes      HPI: Medical, family and surgical history reviewed.  Patient is single, she has no children.  She works full-time as a .  She does not drink alcohol, smoke cigarettes and denies illicit drug use.  She does not follow any special diet, current BMI 20.6.  She does not exercise regularly.  She is taking oral contraception for controlling her acne, she has never been sexually active or had a Pap smear completed.  Menstrual cycles are regular, typically last 3 to 4 days and are light in flow.  She is up-to-date with all vaccinations.  She does not have a healthcare directive.    Additional medical history includes migraines which she uses Imitrex for as needed, typically experiences 2 migraines per year.  If she does not have the Imitrex on hand, her migraines can last upwards of 3 days at a time.  Pain typically occurs on the top portion of the head and she will experience nausea with vomiting and vision changes.  She is up-to-date with vision screening, she does wear glasses and has a significant astigmatism.    She is using Flonase and Zyrtec for management of her seasonal allergies which have been a bit more uncontrolled the season.    Heartburn is well controlled using Pepcid once daily, she is needing a new  prescription sent to [facility] today, her previous pharmacy is no charging her for her prescriptions.    She is taking iron daily during the wintertime only and as needed if her periods become heavy.     Cerebral palsy: Diagnosed in elementary school, she is established at [facility], she has her appointment scheduled for August to meet with her specialist and get an updated set of x-rays.  She has been dealing with some right-sided hip pain for the last 18 months, she did complete physical therapy and has received 1 injection.  She is not using any AFOs at this time, she has tried to avoid this.  She does use gabapentin at nighttime for spasms and jerking movements related to her cerebral palsy, however, she does experience dizziness and drowsiness from the medication, she also uses ibuprofen as needed as well.     ADHD: Well-controlled, medication includes Adderall extended release 20 mg daily.  This is managed by Dr. Gabriel her PCP.  She is due to have her contract updated today.  She denies any illicit drug use, she does rely heavily on this medication in order to complete her tasks at work, she was diagnosed with ADHD back in middle school.  Her last refill was July 13 for 30 tablets.  [facility] reviewed today, no concerns.  She is due for random urine drug screen today as well.        Today's PHQ-2 Score:   PHQ-2 ( 1999 [employer]) 8/2/2021   Q1: Little interest or pleasure in doing things 0   Q2: Feeling down, depressed or hopeless 0   PHQ-2 Score 0   Q1: Little interest or pleasure in doing things Not at all   Q2: Feeling down, depressed or hopeless Not at all   PHQ-2 Score 0       Abuse: Current or Past (Physical, Sexual or Emotional) - No  Do you feel safe in your environment? Yes        Social History     Tobacco Use     Smoking status: Never Smoker     Smokeless tobacco: Never Used     Tobacco comment: no exposure   Substance Use Topics     Alcohol use: No     If you drink alcohol do you  "typically have >3 drinks per day or >7 drinks per week? No    Alcohol Use 8/2/2021   Prescreen: >3 drinks/day or >7 drinks/week? Not Applicable   Prescreen: >3 drinks/day or >7 drinks/week? -       Reviewed orders with patient.  Reviewed health maintenance and updated orders accordingly - Yes  Lab work is in process    Breast Cancer Screening:        History of abnormal Pap smear: NO - age 21-29 PAP every 3 years recommended     Reviewed and updated as needed this visit by clinical staff  Tobacco  Allergies  Meds  Problems  Med Hx    Soc Hx        Reviewed and updated as needed this visit by Provider  Tobacco    Problems  Med Hx    Soc Hx       Past Medical History:   Diagnosis Date     Cerebral palsy with spastic/ataxic diplegia     very mild, has had botox injections for toe walking     History of tonsillectomy      Mild intermittent asthma     none since very young        Review of Systems  CONSTITUTIONAL: NEGATIVE for fever, chills, change in weight  INTEGUMENTARU/SKIN: NEGATIVE for worrisome rashes, moles or lesions  EYES: NEGATIVE for vision changes or irritation  ENT: NEGATIVE for ear, mouth and throat problems  RESP: NEGATIVE for significant cough or SOB  BREAST: NEGATIVE for masses, tenderness or discharge  CV: NEGATIVE for chest pain, palpitations or peripheral edema  GI: NEGATIVE for nausea, abdominal pain, heartburn, or change in bowel habits  : NEGATIVE for unusual urinary or vaginal symptoms. Periods are regular.  MUSCULOSKELETAL: NEGATIVE for significant arthralgias or myalgia  NEURO: NEGATIVE for weakness, dizziness or paresthesias  PSYCHIATRIC: NEGATIVE for changes in mood or affect     OBJECTIVE:   /68   Pulse 75   Temp 98.2  F (36.8  C)   Ht 1.727 m (5' 8\")   Wt 61.7 kg (136 lb)   LMP 07/21/2021 (Approximate)   SpO2 98%   Breastfeeding No   BMI 20.68 kg/m    Physical Exam  GENERAL: healthy, alert and no distress  EYES: Eyes grossly normal to inspection, PERRL and " conjunctivae and sclerae normal  HENT: ear canals and TM's normal, nose and mouth without ulcers or lesions  NECK: no adenopathy, no asymmetry, masses, or scars and thyroid normal to palpation  RESP: lungs clear to auscultation - no rales, rhonchi or wheezes  BREAST: normal without masses, tenderness or nipple discharge and no palpable axillary masses or adenopathy  CV: regular rate and rhythm, normal S1 S2, no S3 or S4, no murmur, click or rub, no peripheral edema and peripheral pulses strong  ABDOMEN: soft, nontender, no hepatosplenomegaly, no masses and bowel sounds normal   (female): normal female external genitalia, normal urethral meatus, vaginal mucosa pink, moist, well rugated, pap deferred due to pain/ patient could not tolerate  RECTAL: normal sphincter tone, no rectal masses  MS: no gross musculoskeletal defects noted, no edema  SKIN: no suspicious lesions or rashes, warm and dry  NEURO: Normal strength and tone, mentation intact and speech normal  PSYCH: mentation appears normal, affect normal/bright  LYMPH: no cervical, supraclavicular, axillary, or inguinal adenopathy    Diagnostic Test Results:  Labs reviewed in Epic    ASSESSMENT/PLAN:   1. Annual physical exam    - BASIC METABOLIC PANEL; Future  - Hemoglobin; Future  - Hepatitis C antibody; Future  - Vitamin D Deficiency; Future  - Lipid Profile; Future  - HIV Antigen Antibody Combo; Future  - Pap thin layer diagnostic reflex to HPV if ASCUS  - REVIEW OF HEALTH MAINTENANCE PROTOCOL ORDERS  Attempted to perform Pap smear x3 today, patient was not able to tolerate so we will try again next year.  She has never been sexually active    2. Attention deficit hyperactivity disorder (ADHD), unspecified ADHD type    - Urine Drugs of Abuse Screen Panel 1 - Drug Screen (Full); Future  - Amphetamines urine POCT, Enter/Edit Result  - Urine Drugs of Abuse Screen Panel 1 - Drug Screen (Full)  [place] PDM reviewed today, last refill was July 13 for her  Adderall 20 mg extended release  She will follow up in 6 months and see her PCP, Dr. Gabriel at that time for her medication updates    3. Controlled substance agreement signed    - Urine Drugs of Abuse Screen Panel 1 - Drug Screen (Full); Future  - Amphetamines urine POCT, Enter/Edit Result  - Urine Drugs of Abuse Screen Panel 1 - Drug Screen (Full)  Controlled substance agreement was updated and signed today by patient  Random urine drug screen completed today    4. Gastroesophageal reflux disease with esophagitis without hemorrhage    - famotidine (PEPCID) 40 MG tablet; Take 1 tablet (40 mg) by mouth every evening  Dispense: 90 tablet; Refill: 3  Symptoms are well controlled, she does need a refill sent to [facility]     5. Cerebral palsy, unspecified type (H)    Managed by specialist at [facility], she is scheduled to follow-up with her specialist in August  She does use gabapentin at night for spasms, she does experience some dizziness and drowsiness from the gabapentin dose.  She is only taking 100 mg at a time.  We did discuss trialing her on baclofen but for now, she will continue with ibuprofen and the gabapentin use since she is only using that sparingly  She is not using any AFOs, they have discussed this in the past but she is hesitant    6. Intractable migraine with aura with status migrainosus    She is using Imitrex as needed, she is only experiencing about 2 migraines per year with aura  If she does not have the Imitrex on hand, her migraines can last up to about 3 days    7. History of iron deficiency    - Ferritin; Future  She only takes this daily during the winter and when her periods are heavy    8. ACP (advance care planning)    Honoring choices packet given to her for completion, she will return to the clinic once it is notarized    Patient has been advised of split billing requirements and indicates understanding: Yes  COUNSELING:  Reviewed preventive health counseling, as  "reflected in patient instructions       Regular exercise       Healthy diet/nutrition       Vision screening       Alcohol Use       Contraception       Consider Hep C screening for all patients one time for ages 18-79 years       HIV screeninx in teen years, 1x in adult years, and at intervals if high risk       Advance Care Planning    Estimated body mass index is 20.68 kg/m  as calculated from the following:    Height as of this encounter: 1.727 m (5' 8\").    Weight as of this encounter: 61.7 kg (136 lb).        She reports that she has never smoked. She has never used smokeless tobacco.     47 minutes spent with chart review, review results, assessment, documentation        Counseling Resources:  ATP IV Guidelines  Pooled Cohorts Equation Calculator  Breast Cancer Risk Calculator  BRCA-Related Cancer Risk Assessment: FHS-7 Tool  FRAX Risk Assessment  ICSI Preventive Guidelines  Dietary Guidelines for Americans, 2010  USDA's MyPlate  ASA Prophylaxis  Lung CA Screening    Anais Sheikh NP  [facility]  "

## 2021-08-02 NOTE — LETTER
[facility] [place]  08/02/21  Patient: Anjelica Ramirez  YOB: 1995  Medical Record Number: [id number]                                                                                  Non-Opioid Controlled Substance Agreement    This is an agreement between you and your provider regarding safe and appropriate use of controlled substances prescribed by your care team. Controlled substances are?medicines that can cause physical and mental dependence (abuse).     There are strict laws about having and using these medicines. We here at [facility] are  committed to working with you in your efforts to get better. To support you in this work, we'll help you schedule regular office appointments for medicine refills. If we must cancel or change your appointment for any reason, we'll make sure you have enough medicine to last until your next appointment.     As a Provider, I will:     Listen carefully to your concerns while treating you with respect.     Recommend a treatment plan that I believe is in your best interest and may involve therapies other than medicine.      Talk with you often about the possible benefits and the risk of harm of any medicine that we prescribe for you.    Assess the safety of this medicine and check how well it works.      Provide a plan on how to taper (discontinue or go off) using this medicine if the decision is made to stop its use.      ::  As a Patient, I understand controlled substances:       Are prescribed by my care provider to help me function or work and manage my condition(s).?    Are strong medicines and can cause serious side effects.       Need to be taken exactly as prescribed.?Combining controlled substances with certain medicines or chemicals (such as illegal drugs, alcohol, sedatives, sleeping pills, and benzodiazepines) can be dangerous or even fatal.? If I stop taking my medicines suddenly, I may have severe withdrawal symptoms.     The  risks, benefits, and side effects of these medicine(s) were explained to me. I agree that:    1. I will take part in other treatments as advised by my care team. This may be psychiatry or counseling, physical therapy, behavioral therapy, group treatment or a referral to specialist.    2. I will keep all my appointments and understand this is part of the monitoring of controlled substances.?My care team may require an office visit for EVERY controlled substance refill. If I miss appointments or don t follow instructions, my care team may stop my medicine    3. I will take my medicines as prescribed. I will not change the dose or schedule unless my care team tells me to. There will be no refills if I run out early.      4. I may be asked to come to the clinic and complete a urine drug test or complete a pill count. If I don t give a urine sample or participate in a pill count, the care team may stop my medicine.    5. I will only receive controlled substance prescriptions from this clinic. If I am treated by another provider, I will tell them that I am taking controlled substances and that I have a treatment agreement with this provider. I will inform my [facility] care team within one business day if I am given a prescription for any controlled substance by another healthcare provider. My [facility] care team can contact other providers and pharmacists about my use of any medicines.    6. It is up to me to make sure that I don't run out of my medicines on weekends or holidays.?If my care team is willing to refill my prescription without a visit, I must request refills only during office hours. Refills may take up to 3 business days to process. I will use one pharmacy to fill all my controlled substance prescriptions. I will notify the clinic about any changes to my insurance or medicine availability.    7. I am responsible for my prescriptions. If the medicine/prescription is lost, stolen or destroyed,  it will not be replaced.?I also agree not to share controlled substance medicines with anyone.     8. I am aware I should not use any illegal or recreational drugs. I agree not to drink alcohol unless my care team says I can.     9. If I enroll in the [place] Medical Cannabis program, I will tell my care team before my next refill.    10. I will tell my care team right away if I become pregnant, have a new medical problem treated outside of my regular clinic, or have a change in my medicines.     11. I understand that this medicine can affect my thinking, judgment and reaction time.? Alcohol and drugs affect the brain and body, which can affect the safety of my driving. Being under the influence of alcohol or drugs can affect my decision-making, behaviors, personal safety and the safety of others. Driving while impaired (DWI) can occur if a person is driving, operating or in physical control of a car, motorcycle, boat, snowmobile, ATV, motorbike, off-road vehicle or any other motor vehicle ([place] Statute 169A.20). I understand the risk if I choose to drive or operate any vehicle or machinery.    I understand that if I do not follow any of the conditions above, my prescriptions or treatment may be stopped or changed.   I agree that my provider, clinic care team and pharmacy may work with any city, state or federal law enforcement agency that investigates the misuse, sale or other diversion of my controlled medicine. I will allow my provider to discuss my care with, or share a copy of, this agreement with any other treating provider, pharmacy or emergency room where I receive care.     I have read this agreement and have asked questions about anything I did not understand.    ________________________________________________________  Patient Signature - Anjelica Raimrez     ___________________                   Date     ________________________________________________________  Provider Signature - Anais Sheikh NP        ___________________                   Date     ________________________________________________________  Witness Signature (required if provider not present while patient signing)          ___________________                   Date

## 2021-08-03 LAB
DEPRECATED CALCIDIOL+CALCIFEROL SERPL-MC: 36 UG/L (ref 30–80)
HCV AB SERPL QL IA: NEGATIVE

## 2021-08-17 DIAGNOSIS — J30.9 ALLERGIC RHINITIS, UNSPECIFIED SEASONALITY, UNSPECIFIED TRIGGER: Primary | ICD-10-CM

## 2021-08-19 NOTE — TELEPHONE ENCOUNTER
"  Last office visit provider:  8/4/20       Requested Prescriptions   Pending Prescriptions Disp Refills     fluticasone (FLONASE) 50 MCG/ACT nasal spray [Pharmacy Med Name: FLUTICASONE PROP NASAL SPRAY 16GM 50MCG] 48 g 3     Sig: USE 2 SPRAYS IN EACH NOSTRIL DAILY       Nasal Allergy Protocol Passed - 8/17/2021 12:17 AM        Passed - Patient is age 12 or older        Passed - Recent (12 mo) or future (30 days) visit within the authorizing provider's specialty     Patient has had an office visit with the authorizing provider or a provider within the authorizing providers department within the previous 12 mos or has a future within next 30 days. See \"Patient Info\" tab in inbasket, or \"Choose Columns\" in Meds & Orders section of the refill encounter.              Passed - Medication is active on med list             lyndsey franz RN 08/18/21 11:46 PM  "

## 2021-08-23 RX ORDER — FLUTICASONE PROPIONATE 50 MCG
SPRAY, SUSPENSION (ML) NASAL
Qty: 48 G | Refills: 3 | Status: SHIPPED | OUTPATIENT
Start: 2021-08-23

## 2021-08-27 ENCOUNTER — TRANSFERRED RECORDS (OUTPATIENT)
Dept: HEALTH INFORMATION MANAGEMENT | Facility: CLINIC | Age: 26
End: 2021-08-27

## 2021-09-03 DIAGNOSIS — G80.9 CEREBRAL PALSY, UNSPECIFIED TYPE (H): Primary | ICD-10-CM

## 2021-09-03 RX ORDER — IBUPROFEN 600 MG/1
TABLET, FILM COATED ORAL
Qty: 60 TABLET | Refills: 5 | Status: SHIPPED | OUTPATIENT
Start: 2021-09-03 | End: 2022-08-29

## 2021-09-03 NOTE — TELEPHONE ENCOUNTER
Disp Refills Start End LORIE    ibuprofen (ADVIL,MOTRIN) 600 MG tablet 60 tablet 5 9/24/2020  No   Sig: TAKE 1 TABLET DAILY WITH FOOD AS NEEDED FOR PAIN   Sent to pharmacy as: ibuprofen 600 mg tablet (ADVIL,MOTRIN)   E-Prescribing Status: Receipt confirmed by pharmacy (9/24/2020  5:01 PM CDT)   ibuprofen (ADVIL,MOTRIN) 600 MG tablet [[id number]]    Electronically signed by: Lacey Gabriel MD on 09/24/20 1701 Status: Active   Ordering user: Lacey Gabriel MD 09/24/20 1701 Authorized by: Lacey Gabriel MD   Frequency:  09/24/20 - Until Discontinued Released by: Lacey Gabriel MD 09/24/20 1701   Diagnoses  Body aches [R52]       Routing refill request to provider for review/approval because:  Labs not current:  Multiple      Last Written Prescription Date:  9/24/20  Last Fill Quantity: 60,  # refills: 5   Last office visit provider:  8/2/21     Requested Prescriptions   Pending Prescriptions Disp Refills     ibuprofen (ADVIL/MOTRIN) 600 MG tablet [Pharmacy Med Name: IBUPROFEN TABS 600MG] 60 tablet 5     Sig: TAKE 1 TABLET DAILY WITH FOOD AS NEEDED FOR PAIN       NSAID Medications Failed - 9/3/2021 12:15 AM        Failed - Normal ALT on file in past 12 months     Recent Labs   Lab Test 03/10/20  1126   ALT 25             Failed - Normal AST on file in past 12 months     Recent Labs   Lab Test 03/10/20  1126   AST 18             Failed - Normal CBC on file in past 12 months     Recent Labs   Lab Test 08/02/21  1046 03/10/20  1126   WBC  --  4.8   RBC  --  4.61   HGB 12.5 13.8   HCT  --  41.5   PLT  --  302                 Passed - Blood pressure under 140/90 in past 12 months     BP Readings from Last 3 Encounters:   08/02/21 100/68   09/02/20 115/82   08/04/20 112/56                 Passed - Recent (12 mo) or future (30 days) visit within the authorizing provider's specialty     Patient has had an office visit with the authorizing provider or a provider within the  "authorizing providers department within the previous 12 mos or has a future within next 30 days. See \"Patient Info\" tab in inbasket, or \"Choose Columns\" in Meds & Orders section of the refill encounter.              Passed - Patient is age 6-64 years        Passed - Medication is active on med list        Passed - No active pregnancy on record        Passed - Normal serum creatinine on file in past 12 months     Recent Labs   Lab Test 08/02/21  1046   CR 0.69       Ok to refill medication if creatinine is low          Passed - No positive pregnancy test in past 12 months             Daniel Ayers RN 09/03/21 2:48 PM  "

## 2021-09-21 DIAGNOSIS — L70.9 ACNE: Primary | ICD-10-CM

## 2021-09-22 RX ORDER — NORGESTIMATE AND ETHINYL ESTRADIOL 0.25-0.035
KIT ORAL
Qty: 84 TABLET | Refills: 3 | Status: SHIPPED | OUTPATIENT
Start: 2021-09-22 | End: 2022-08-25

## 2021-09-22 NOTE — TELEPHONE ENCOUNTER
"Last Written Prescription Date:  10/22/20  Last Fill Quantity: 84,  # refills: 3   Last office visit provider:  8/2/21     Requested Prescriptions   Pending Prescriptions Disp Refills     ESTARYLLA 0.25-35 MG-MCG tablet [Pharmacy Med Name: NORGEST/E ES(ESTARYLLA)TB 28S 0.25/35] 84 tablet 3     Sig: TAKE 1 TABLET DAILY       Contraceptives Protocol Passed - 9/21/2021 11:38 PM        Passed - Patient is not a current smoker if age is 35 or older        Passed - Recent (12 mo) or future (30 days) visit within the authorizing provider's specialty     Patient has had an office visit with the authorizing provider or a provider within the authorizing providers department within the previous 12 mos or has a future within next 30 days. See \"Patient Info\" tab in inbasket, or \"Choose Columns\" in Meds & Orders section of the refill encounter.              Passed - Medication is active on med list        Passed - No active pregnancy on record        Passed - No positive pregnancy test in past 12 months             Daniel Ayers RN 09/22/21 7:06 AM  "

## 2021-09-26 ENCOUNTER — HEALTH MAINTENANCE LETTER (OUTPATIENT)
Age: 26
End: 2021-09-26

## 2021-10-13 DIAGNOSIS — F90.9 ATTENTION DEFICIT HYPERACTIVITY DISORDER (ADHD), UNSPECIFIED ADHD TYPE: ICD-10-CM

## 2021-10-14 RX ORDER — DEXTROAMPHETAMINE SACCHARATE, AMPHETAMINE ASPARTATE MONOHYDRATE, DEXTROAMPHETAMINE SULFATE AND AMPHETAMINE SULFATE 5; 5; 5; 5 MG/1; MG/1; MG/1; MG/1
CAPSULE, EXTENDED RELEASE ORAL
Qty: 30 CAPSULE | Refills: 0 | Status: SHIPPED | OUTPATIENT
Start: 2021-10-14 | End: 2021-12-28

## 2021-10-14 NOTE — TELEPHONE ENCOUNTER
Medication: Adderall XR 20mg  Last Date Filled 9/6/21  Last appointment addressing medication use: 8/2/21    CSA in last year: YES    Random Utox in last year: YES  (if any of the above answer NO - schedule with PCP)     Opioids + benzodiazepines? NO  (if the above answer YES - schedule with PCP every 6 months)       All responses suggest:

## 2021-10-14 NOTE — TELEPHONE ENCOUNTER
Routing refill request to provider for review/approval because:  Controlled substance request.    Last Written Prescription Date:  9/3/21  Last Fill Quantity: 30,  # refills: 0   Last office visit provider:  8/2/21     Requested Prescriptions   Pending Prescriptions Disp Refills     amphetamine-dextroamphetamine (ADDERALL XR) 20 MG 24 hr capsule [Pharmacy Med Name: AMPHETAMINE-DEXTROAMPHET ER 20 CP24] 30 capsule 0     Sig: TAKE ONE CAPSULE BY MOUTH EVERY MORNING       There is no refill protocol information for this order          Yaz Hill RN 10/14/21 12:21 PM

## 2022-01-06 ENCOUNTER — NURSE TRIAGE (OUTPATIENT)
Dept: NURSING | Facility: CLINIC | Age: 27
End: 2022-01-06
Payer: COMMERCIAL

## 2022-01-06 NOTE — TELEPHONE ENCOUNTER
"Pt reports exposure to Covid recently \"my whole family has it\". Pt reports \"chest pain and winded going up stairs since 1/2/21 \"overall getting worse\". Pt reports she has had three doses of Pfizer vaccination. Pt reports chest pain is \"dull aching burning upper chest and sternum, for past two days\". Pt does not sound in acute respiratory distress while speaking to Writer. Pt reports pulse is 67 and regular at time of call. Pt reports family history of heart disease.     Writer advised pt to have another adult drive her to the ER now per protocol. Explained to pt disposition is due to c/o new onset difficulty breathing with exertion as well as worsening chest pain. Call 911 if any severe symptoms develop.    Pt reluctant to go to the ER but agreed after explanation of disposition.     Reason for Disposition    [1] Adult with possible COVID-19 symptoms AND [2] triager concerned about severity of symptoms or other causes    Difficulty breathing    Additional Information    Negative: SEVERE difficulty breathing (e.g., struggling for each breath, speaks in single words)    Negative: Difficult to awaken or acting confused (e.g., disoriented, slurred speech)    Negative: Bluish (or gray) lips or face now    Negative: Shock suspected (e.g., cold/pale/clammy skin, too weak to stand, low BP, rapid pulse)    Negative: Sounds like a life-threatening emergency to the triager    Protocols used: CORONAVIRUS (COVID-19) DIAGNOSED OR [id number]-VS 8.25.2021, CHEST PAIN-A-AH      "

## 2022-01-07 ENCOUNTER — TELEPHONE (OUTPATIENT)
Dept: FAMILY MEDICINE | Facility: CLINIC | Age: 27
End: 2022-01-07
Payer: COMMERCIAL

## 2022-01-07 NOTE — TELEPHONE ENCOUNTER
"Called patient and spoke with her after seeing the nurse triage from yesterday and requested by Dr. Gabriel. Patient's whole family has Covid and she was SOB and overall getting worse - she was told to go to ER yesterday and it was charted that she was going to go yesterday but she did not. Informed patient that we are trying to limit exposure here so we could do a virtual visit and send her for testing if need be but she declined and abruptly ended the phone call by saying \"I'll just stay home, bye!\"    Appointment cancelled by me.     Shu Mooney, [facility]    "

## 2022-01-28 ENCOUNTER — VIRTUAL VISIT (OUTPATIENT)
Dept: FAMILY MEDICINE | Facility: CLINIC | Age: 27
End: 2022-01-28
Payer: COMMERCIAL

## 2022-01-28 DIAGNOSIS — F90.9 ATTENTION DEFICIT HYPERACTIVITY DISORDER (ADHD), UNSPECIFIED ADHD TYPE: Primary | ICD-10-CM

## 2022-01-28 DIAGNOSIS — J98.01 ACUTE BRONCHOSPASM: ICD-10-CM

## 2022-01-28 DIAGNOSIS — K64.4 EXTERNAL HEMORRHOIDS: ICD-10-CM

## 2022-01-28 PROCEDURE — 99214 OFFICE O/P EST MOD 30 MIN: CPT | Mod: 95 | Performed by: FAMILY MEDICINE

## 2022-01-28 RX ORDER — SUMATRIPTAN 25 MG/1
25 TABLET, FILM COATED ORAL
Status: CANCELLED | OUTPATIENT
Start: 2022-01-28

## 2022-01-28 RX ORDER — DEXTROAMPHETAMINE SACCHARATE, AMPHETAMINE ASPARTATE MONOHYDRATE, DEXTROAMPHETAMINE SULFATE AND AMPHETAMINE SULFATE 5; 5; 5; 5 MG/1; MG/1; MG/1; MG/1
20 CAPSULE, EXTENDED RELEASE ORAL EVERY MORNING
Qty: 30 CAPSULE | Refills: 0 | Status: SHIPPED | OUTPATIENT
Start: 2022-01-28 | End: 2022-03-03

## 2022-01-28 RX ORDER — HYDROCORTISONE 25 MG/G
CREAM TOPICAL 2 TIMES DAILY PRN
Qty: 30 G | Refills: 3 | Status: SHIPPED | OUTPATIENT
Start: 2022-01-28 | End: 2022-10-11

## 2022-01-28 RX ORDER — ALBUTEROL SULFATE 90 UG/1
2 AEROSOL, METERED RESPIRATORY (INHALATION) EVERY 6 HOURS PRN
Qty: 8.5 G | Refills: 3 | Status: SHIPPED | OUTPATIENT
Start: 2022-01-28 | End: 2022-10-11

## 2022-01-28 NOTE — PROGRESS NOTES
Anjelica is a 26 year old who is being evaluated via a billable video visit.      How would you like to obtain your AVS? MyChart  If the video visit is dropped, the invitation should be resent by: Text to cell phone: per chart  Will anyone else be joining your video visit? No      Video Start Time: 7:32 AM    Assessment & Plan     Attention deficit hyperactivity disorder (ADHD), unspecified ADHD type  -Doing well with medication. She will continue with current medicine and f/u in 6 months.   -CSA updated August of 2021.   - amphetamine-dextroamphetamine (ADDERALL XR) 20 MG 24 hr capsule  Dispense: 30 capsule; Refill: 0    External hemorrhoids  -Ok to use as needed, continue with this and f/u as needed  - hydrocortisone, Perianal, (HYDROCORTISONE) 2.5 % cream  Dispense: 30 g; Refill: 3    Acute bronchospasm  -Discussed that a lot of people post Covid have needed steroid inhalers.  We will start with albuterol as she has done well with this previously and if not improving she will let me know and I can send Flovent in for her.  - albuterol (PROAIR HFA/PROVENTIL HFA/VENTOLIN HFA) 108 (90 Base) MCG/ACT inhaler  Dispense: 8.5 g; Refill: 3             No follow-ups on file.    Lacey Gabriel MD  [facility]   Anjelica is a 26 year old who presents for the following health issues     HPI     She is following up for her medication.     She does note that her asthma has been acting up. She would like a new inhaler.     She does need a cream as well to help with her bottom. She does have hemorrhoids and needs this occasionally to help with them.     She does have some muscle pain on the right, it's been there for a few weeks, does feel like it's been getting worse. It's on the right. It hurts more with breathing out, but not in. Her cough has been getting better. It's more of the middle of her back and wraps around. It hurts a lot with coughing and half coughing.     Her adderall is due  for a refill as well. The dose is working well for her.     She hasn't need the gabapentin much either.       Review of Systems   Per above      Objective           Vitals:  No vitals were obtained today due to virtual visit.    Physical Exam   GENERAL: Healthy, alert and no distress  EYES: Eyes grossly normal to inspection.  No discharge or erythema, or obvious scleral/conjunctival abnormalities.  RESP: No audible wheeze, cough, or visible cyanosis.  No visible retractions or increased work of breathing.    SKIN: Visible skin clear. No significant rash, abnormal pigmentation or lesions.  NEURO: Cranial nerves grossly intact.  Mentation and speech appropriate for age.  PSYCH: Mentation appears normal, affect normal/bright, judgement and insight intact, normal speech and appearance well-groomed.              Video-Visit Details    Type of service:  Video Visit    Video End Time:07:43 AM    Originating Location (pt. Location): Home    Distant Location (provider location):  [facility]     Platform used for Video Visit: [employer]

## 2022-02-28 DIAGNOSIS — F90.9 ATTENTION DEFICIT HYPERACTIVITY DISORDER (ADHD), UNSPECIFIED ADHD TYPE: ICD-10-CM

## 2022-03-02 NOTE — TELEPHONE ENCOUNTER
Routing refill request to provider for review/approval because:  Controlled substance.  CSA dated 8/14/2021.    Last Written Prescription Date:  1/28/2022  Last Fill Quantity: 30,  # refills: 0   Last office visit provider:  1/28/2022     Requested Prescriptions   Pending Prescriptions Disp Refills     amphetamine-dextroamphetamine (ADDERALL XR) 20 MG 24 hr capsule [Pharmacy Med Name: AMPHETAMINE-DEXTROAMPHET ER 20 CP24] 30 capsule 0     Sig: TAKE 1 CAPSULE BY MOUTH EVERY MORNING       There is no refill protocol information for this order          Zainab Fatima RN 03/02/22 1:51 PM

## 2022-03-03 RX ORDER — DEXTROAMPHETAMINE SACCHARATE, AMPHETAMINE ASPARTATE MONOHYDRATE, DEXTROAMPHETAMINE SULFATE AND AMPHETAMINE SULFATE 5; 5; 5; 5 MG/1; MG/1; MG/1; MG/1
CAPSULE, EXTENDED RELEASE ORAL
Qty: 30 CAPSULE | Refills: 0 | Status: SHIPPED | OUTPATIENT
Start: 2022-03-03 | End: 2022-06-03

## 2022-05-23 DIAGNOSIS — K21.00 GASTROESOPHAGEAL REFLUX DISEASE WITH ESOPHAGITIS WITHOUT HEMORRHAGE: Primary | ICD-10-CM

## 2022-05-23 NOTE — TELEPHONE ENCOUNTER
"Routing refill request to provider for review/approval because:  Drug not active on patient's medication list    Last Written Prescription Date:    Last Fill Quantity: ,  # refills:    Last office visit provider:  1/28/22     Requested Prescriptions   Pending Prescriptions Disp Refills     esomeprazole (NEXIUM) 40 MG DR capsule [Pharmacy Med Name: ESOMEPRAZOLE MAGNESIUM DR CAPS 40MG] 90 capsule 3     Sig: TAKE 1 CAPSULE EVERY MORNING BEFORE BREAKFAST       PPI Protocol Failed - 5/23/2022 12:14 AM        Failed - Medication is active on med list        Passed - Not on Clopidogrel (unless Pantoprazole ordered)        Passed - No diagnosis of osteoporosis on record        Passed - Recent (12 mo) or future (30 days) visit within the authorizing provider's specialty     Patient has had an office visit with the authorizing provider or a provider within the authorizing providers department within the previous 12 mos or has a future within next 30 days. See \"Patient Info\" tab in inbasket, or \"Choose Columns\" in Meds & Orders section of the refill encounter.              Passed - Patient is age 18 or older        Passed - No active pregnacy on record        Passed - No positive pregnancy test in past 12 months             Thu Washburn, RN 05/23/22 6:16 PM  "

## 2022-05-24 RX ORDER — ESOMEPRAZOLE MAGNESIUM 40 MG/1
CAPSULE, DELAYED RELEASE ORAL
Qty: 90 CAPSULE | Refills: 1 | Status: SHIPPED | OUTPATIENT
Start: 2022-05-24

## 2022-06-01 DIAGNOSIS — F90.9 ATTENTION DEFICIT HYPERACTIVITY DISORDER (ADHD), UNSPECIFIED ADHD TYPE: ICD-10-CM

## 2022-06-02 ENCOUNTER — MYC MEDICAL ADVICE (OUTPATIENT)
Dept: FAMILY MEDICINE | Facility: CLINIC | Age: 27
End: 2022-06-02
Payer: COMMERCIAL

## 2022-06-03 RX ORDER — DEXTROAMPHETAMINE SACCHARATE, AMPHETAMINE ASPARTATE MONOHYDRATE, DEXTROAMPHETAMINE SULFATE AND AMPHETAMINE SULFATE 5; 5; 5; 5 MG/1; MG/1; MG/1; MG/1
CAPSULE, EXTENDED RELEASE ORAL
Qty: 30 CAPSULE | Refills: 0 | Status: SHIPPED | OUTPATIENT
Start: 2022-06-03 | End: 2022-07-04

## 2022-06-03 NOTE — TELEPHONE ENCOUNTER
Routing refill request to provider for review/approval because:  Controlled substance request    Last Written Prescription Date:  3/3/22  Last Fill Quantity: 30,  # refills: 0   Last office visit provider:  1/28/22     Requested Prescriptions   Pending Prescriptions Disp Refills     amphetamine-dextroamphetamine (ADDERALL XR) 20 MG 24 hr capsule [Pharmacy Med Name: AMPHETAMINE-DEXTROAMPHET ER 20 CP24] 30 capsule 0     Sig: TAKE ONE CAPSULE BY MOUTH EVERY MORNING       There is no refill protocol information for this order          Daniel Ayers RN 06/03/22 7:28 AM

## 2022-06-03 NOTE — TELEPHONE ENCOUNTER
Medication: Adderall XR 20mg  Last Date Filled 3/3/22  Last appointment addressing medication use: 1/28/22    CSA in last year: YES    Random Utox in last year: YES  (if any of the above answer NO - schedule with PCP)     Opioids + benzodiazepines? NO  (if the above answer YES - schedule with PCP every 6 months)       All responses suggest:

## 2022-06-03 NOTE — TELEPHONE ENCOUNTER
Advised patient to monitor. Return to clinic or be seen right away for.. If they become red, swollen, increase in size, warm to touch, streaks of red going from the area, or any pain, I would be seen right away. If you become symptomatic with any muscle aches, fever, or headaches, please be seen right away.  Socorro Bae RN on 6/3/2022 at 8:28 AM

## 2022-07-03 DIAGNOSIS — F90.9 ATTENTION DEFICIT HYPERACTIVITY DISORDER (ADHD), UNSPECIFIED ADHD TYPE: ICD-10-CM

## 2022-07-04 RX ORDER — DEXTROAMPHETAMINE SACCHARATE, AMPHETAMINE ASPARTATE MONOHYDRATE, DEXTROAMPHETAMINE SULFATE AND AMPHETAMINE SULFATE 5; 5; 5; 5 MG/1; MG/1; MG/1; MG/1
CAPSULE, EXTENDED RELEASE ORAL
Qty: 30 CAPSULE | Refills: 0 | Status: SHIPPED | OUTPATIENT
Start: 2022-07-04 | End: 2022-08-19

## 2022-07-04 NOTE — TELEPHONE ENCOUNTER
Please contact patient, she is due for a visit if we could help schedule this. In person would be best, she could schedule a physical after 8/2/22

## 2022-07-05 NOTE — TELEPHONE ENCOUNTER
Patient notified of this information, states understanding, and has no further questions.    Scheduled 8/24/22

## 2022-07-11 DIAGNOSIS — K21.00 GASTROESOPHAGEAL REFLUX DISEASE WITH ESOPHAGITIS WITHOUT HEMORRHAGE: ICD-10-CM

## 2022-07-11 RX ORDER — FAMOTIDINE 40 MG/1
TABLET, FILM COATED ORAL
Qty: 90 TABLET | Refills: 1 | Status: SHIPPED | OUTPATIENT
Start: 2022-07-11 | End: 2023-01-06

## 2022-07-11 NOTE — TELEPHONE ENCOUNTER
"Last Written Prescription Date:  8/2/21  Last Fill Quantity: 90,  # refills: 3   Last office visit provider:  1/28/22     Requested Prescriptions   Pending Prescriptions Disp Refills     famotidine (PEPCID) 40 MG tablet [Pharmacy Med Name: FAMOTIDINE TABS 40MG] 90 tablet 3     Sig: TAKE 1 TABLET EVERY EVENING       H2 Blockers Protocol Passed - 7/11/2022 12:17 AM        Passed - Patient is age 12 or older        Passed - Recent (12 mo) or future (30 days) visit within the authorizing provider's specialty     Patient has had an office visit with the authorizing provider or a provider within the authorizing providers department within the previous 12 mos or has a future within next 30 days. See \"Patient Info\" tab in inbasket, or \"Choose Columns\" in Meds & Orders section of the refill encounter.              Passed - Medication is active on med Thu Foley RN 07/11/22 6:32 PM  "

## 2022-08-19 DIAGNOSIS — F90.9 ATTENTION DEFICIT HYPERACTIVITY DISORDER (ADHD), UNSPECIFIED ADHD TYPE: ICD-10-CM

## 2022-08-19 RX ORDER — DEXTROAMPHETAMINE SACCHARATE, AMPHETAMINE ASPARTATE MONOHYDRATE, DEXTROAMPHETAMINE SULFATE AND AMPHETAMINE SULFATE 5; 5; 5; 5 MG/1; MG/1; MG/1; MG/1
CAPSULE, EXTENDED RELEASE ORAL
Qty: 30 CAPSULE | Refills: 0 | Status: SHIPPED | OUTPATIENT
Start: 2022-08-19 | End: 2022-09-20

## 2022-08-24 DIAGNOSIS — L70.9 ACNE: ICD-10-CM

## 2022-08-25 RX ORDER — NORGESTIMATE AND ETHINYL ESTRADIOL 0.25-0.035
KIT ORAL
Qty: 84 TABLET | Refills: 1 | Status: SHIPPED | OUTPATIENT
Start: 2022-08-25 | End: 2023-02-09

## 2022-08-25 NOTE — TELEPHONE ENCOUNTER
"Last Written Prescription Date:  9/22/2021  Last Fill Quantity: 84,  # refills: 3   Last office visit provider:  8/2/22     Requested Prescriptions   Pending Prescriptions Disp Refills     ESTARYLLA 0.25-35 MG-MCG tablet [Pharmacy Med Name: NORGEST/E ES(ESTARYLLA)TB 28S 0.25/35] 84 tablet 3     Sig: TAKE 1 TABLET DAILY       Contraceptives Protocol Passed - 8/25/2022 12:31 PM        Passed - Patient is not a current smoker if age is 35 or older        Passed - Recent (12 mo) or future (30 days) visit within the authorizing provider's specialty     Patient has had an office visit with the authorizing provider or a provider within the authorizing providers department within the previous 12 mos or has a future within next 30 days. See \"Patient Info\" tab in inbasket, or \"Choose Columns\" in Meds & Orders section of the refill encounter.              Passed - Medication is active on med list        Passed - No active pregnancy on record        Passed - No positive pregnancy test in past 12 months             Lacey Metz RN 08/25/22 12:31 PM  "

## 2022-08-29 DIAGNOSIS — G80.9 CEREBRAL PALSY, UNSPECIFIED TYPE (H): ICD-10-CM

## 2022-08-29 RX ORDER — IBUPROFEN 600 MG/1
TABLET, FILM COATED ORAL
Qty: 60 TABLET | Refills: 5 | Status: SHIPPED | OUTPATIENT
Start: 2022-08-29 | End: 2023-08-24

## 2022-08-29 NOTE — TELEPHONE ENCOUNTER
"Routing refill request to provider for review/approval because:  Labs not current:  Multiple  BP not current    Last Written Prescription Date:  9/3/21  Last Fill Quantity: 60,  # refills: 5   Last office visit provider:  1/28/22     Requested Prescriptions   Pending Prescriptions Disp Refills     ibuprofen (ADVIL/MOTRIN) 600 MG tablet [Pharmacy Med Name: IBUPROFEN TABS 600MG] 60 tablet 5     Sig: TAKE 1 TABLET DAILY WITH FOOD AS NEEDED FOR PAIN       NSAID Medications Failed - 8/29/2022 10:20 AM        Failed - Blood pressure under 140/90 in past 12 months     BP Readings from Last 3 Encounters:   08/02/21 100/68   09/02/20 115/82   08/04/20 112/56                 Failed - Normal ALT on file in past 12 months     Recent Labs   Lab Test 03/10/20  1126   ALT 25             Failed - Normal AST on file in past 12 months     Recent Labs   Lab Test 03/10/20  1126   AST 18             Failed - Normal CBC on file in past 12 months     Recent Labs   Lab Test 08/02/21  1046 03/10/20  1126   WBC  --  4.8   RBC  --  4.61   HGB 12.5 13.8   HCT  --  41.5   PLT  --  302                 Failed - Normal serum creatinine on file in past 12 months     Recent Labs   Lab Test 08/02/21  1046   CR 0.69       Ok to refill medication if creatinine is low          Passed - Recent (12 mo) or future (30 days) visit within the authorizing provider's specialty     Patient has had an office visit with the authorizing provider or a provider within the authorizing providers department within the previous 12 mos or has a future within next 30 days. See \"Patient Info\" tab in inbasket, or \"Choose Columns\" in Meds & Orders section of the refill encounter.              Passed - Patient is age 6-64 years        Passed - Medication is active on med list        Passed - No active pregnancy on record        Passed - No positive pregnancy test in past 12 months             Daniel Ayers RN 08/29/22 10:20 AM  "

## 2022-09-20 DIAGNOSIS — F90.9 ATTENTION DEFICIT HYPERACTIVITY DISORDER (ADHD), UNSPECIFIED ADHD TYPE: ICD-10-CM

## 2022-09-20 RX ORDER — DEXTROAMPHETAMINE SACCHARATE, AMPHETAMINE ASPARTATE MONOHYDRATE, DEXTROAMPHETAMINE SULFATE AND AMPHETAMINE SULFATE 5; 5; 5; 5 MG/1; MG/1; MG/1; MG/1
CAPSULE, EXTENDED RELEASE ORAL
Qty: 30 CAPSULE | Refills: 0 | Status: SHIPPED | OUTPATIENT
Start: 2022-09-20 | End: 2022-10-11

## 2022-10-04 ASSESSMENT — ENCOUNTER SYMPTOMS
COUGH: 0
CONSTIPATION: 0
NERVOUS/ANXIOUS: 0
FEVER: 0
DIZZINESS: 0
HEARTBURN: 1
CHILLS: 0
NAUSEA: 1
ARTHRALGIAS: 1
DYSURIA: 0
FREQUENCY: 1
WEAKNESS: 0
HEMATOCHEZIA: 0
DIARRHEA: 0
SHORTNESS OF BREATH: 0
HEADACHES: 1
HEMATURIA: 0
JOINT SWELLING: 0
ABDOMINAL PAIN: 1
SORE THROAT: 0
EYE PAIN: 0
PARESTHESIAS: 0
BREAST MASS: 0
MYALGIAS: 1
PALPITATIONS: 0

## 2022-10-11 ENCOUNTER — OFFICE VISIT (OUTPATIENT)
Dept: FAMILY MEDICINE | Facility: CLINIC | Age: 27
End: 2022-10-11
Payer: COMMERCIAL

## 2022-10-11 VITALS
WEIGHT: 142.3 LBS | DIASTOLIC BLOOD PRESSURE: 68 MMHG | BODY MASS INDEX: 22.34 KG/M2 | HEART RATE: 96 BPM | HEIGHT: 67 IN | SYSTOLIC BLOOD PRESSURE: 116 MMHG | OXYGEN SATURATION: 100 %

## 2022-10-11 DIAGNOSIS — Z51.81 ENCOUNTER FOR THERAPEUTIC DRUG MONITORING: ICD-10-CM

## 2022-10-11 DIAGNOSIS — F90.9 ATTENTION DEFICIT HYPERACTIVITY DISORDER (ADHD), UNSPECIFIED ADHD TYPE: ICD-10-CM

## 2022-10-11 DIAGNOSIS — Z00.00 ENCOUNTER FOR ROUTINE HISTORY AND PHYSICAL EXAMINATION OF ADULT: Primary | ICD-10-CM

## 2022-10-11 DIAGNOSIS — R19.8 IRREGULAR BOWEL HABITS: ICD-10-CM

## 2022-10-11 DIAGNOSIS — Z23 IMMUNIZATION DUE: ICD-10-CM

## 2022-10-11 DIAGNOSIS — G43.111 INTRACTABLE MIGRAINE WITH AURA WITH STATUS MIGRAINOSUS: ICD-10-CM

## 2022-10-11 DIAGNOSIS — Z12.4 CERVICAL CANCER SCREENING: ICD-10-CM

## 2022-10-11 DIAGNOSIS — J98.01 ACUTE BRONCHOSPASM: ICD-10-CM

## 2022-10-11 PROCEDURE — 91312 COVID-19,PF,PFIZER BOOSTER BIVALENT: CPT | Performed by: FAMILY MEDICINE

## 2022-10-11 PROCEDURE — 80307 DRUG TEST PRSMV CHEM ANLYZR: CPT | Performed by: FAMILY MEDICINE

## 2022-10-11 PROCEDURE — 90471 IMMUNIZATION ADMIN: CPT | Performed by: FAMILY MEDICINE

## 2022-10-11 PROCEDURE — 99214 OFFICE O/P EST MOD 30 MIN: CPT | Mod: 25 | Performed by: FAMILY MEDICINE

## 2022-10-11 PROCEDURE — 90686 IIV4 VACC NO PRSV 0.5 ML IM: CPT | Performed by: FAMILY MEDICINE

## 2022-10-11 PROCEDURE — 0124A COVID-19,PF,PFIZER BOOSTER BIVALENT: CPT | Performed by: FAMILY MEDICINE

## 2022-10-11 PROCEDURE — 99395 PREV VISIT EST AGE 18-39: CPT | Mod: 25 | Performed by: FAMILY MEDICINE

## 2022-10-11 RX ORDER — ALBUTEROL SULFATE 90 UG/1
2 AEROSOL, METERED RESPIRATORY (INHALATION) EVERY 6 HOURS PRN
Qty: 8.5 G | Refills: 3 | Status: SHIPPED | OUTPATIENT
Start: 2022-10-11

## 2022-10-11 RX ORDER — DEXTROAMPHETAMINE SACCHARATE, AMPHETAMINE ASPARTATE MONOHYDRATE, DEXTROAMPHETAMINE SULFATE AND AMPHETAMINE SULFATE 5; 5; 5; 5 MG/1; MG/1; MG/1; MG/1
20 CAPSULE, EXTENDED RELEASE ORAL EVERY MORNING
Qty: 30 CAPSULE | Refills: 0 | Status: SHIPPED | OUTPATIENT
Start: 2022-10-11 | End: 2022-11-23

## 2022-10-11 RX ORDER — RIZATRIPTAN BENZOATE 5 MG/1
5 TABLET ORAL
Qty: 10 TABLET | Refills: 3 | Status: SHIPPED | OUTPATIENT
Start: 2022-10-11

## 2022-10-11 ASSESSMENT — ENCOUNTER SYMPTOMS
HEARTBURN: 1
FEVER: 0
HEADACHES: 1
CHILLS: 0
EYE PAIN: 0
HEMATOCHEZIA: 0
PALPITATIONS: 0
ARTHRALGIAS: 1
DYSURIA: 0
SORE THROAT: 0
DIZZINESS: 0
HEMATURIA: 0
FREQUENCY: 1
DIARRHEA: 0
COUGH: 0
ABDOMINAL PAIN: 1
WEAKNESS: 0
SHORTNESS OF BREATH: 0
BREAST MASS: 0
PARESTHESIAS: 0
JOINT SWELLING: 0
CONSTIPATION: 0
MYALGIAS: 1
NAUSEA: 1
NERVOUS/ANXIOUS: 0

## 2022-10-11 ASSESSMENT — PAIN SCALES - GENERAL: PAINLEVEL: NO PAIN (0)

## 2022-10-11 NOTE — PROGRESS NOTES
SUBJECTIVE:   CC: Anjelica is an 27 year old who presents for preventive health visit.       Patient has been advised of split billing requirements and indicates understanding: Yes  Healthy Habits:     Getting at least 3 servings of Calcium per day:  NO    Bi-annual eye exam:  Yes    Dental care twice a year:  Yes    Sleep apnea or symptoms of sleep apnea:  None    Diet:  Regular (no restrictions)    Frequency of exercise:  2-3 days/week    Duration of exercise:  15-30 minutes    Taking medications regularly:  Yes    PHQ-2 Total Score: 0    Additional concerns today:  Yes      Migraines are back, she did have them quite a bit since covid, has had four since her covid infection. She hadn't had one for year prior to covid. They are less intense but last longer which is an issues with driving. She remembers that if she took them too early or too late it would make her migraine worse. She is now 10 months out.     She has had some abdominal issues since she moved. She is having to have bowel movements up to five times a day. She does then have issues with her hemorrhoids. She has been cutting citric acid out of her diet, and that has been helping some as well.     Birth control is still working ok. She does note that when she takes it she feels like crying. She does note that her acne was worse off it.     Adderall dose is ok.     Heartburn has been worse as well. She does only have pepcid right now. Her bed is still tipped up. She has gained some weight.     She deos have some toe pain, left foot. Feels like she stubbed it.     Today's PHQ-2 Score:   PHQ-2 ( 1999 Pfizer) 10/4/2022   Q1: Little interest or pleasure in doing things 0   Q2: Feeling down, depressed or hopeless 0   PHQ-2 Score 0   PHQ-2 Total Score (12-17 Years)- Positive if 3 or more points; Administer PHQ-A if positive -   Q1: Little interest or pleasure in doing things Not at all   Q2: Feeling down, depressed or hopeless Not at all   PHQ-2 Score 0        Abuse: Current or Past (Physical, Sexual or Emotional) - No  Do you feel safe in your environment? Yes        Social History     Tobacco Use     Smoking status: Never     Smokeless tobacco: Never     Tobacco comments:     no exposure   Substance Use Topics     Alcohol use: No     If you drink alcohol do you typically have >3 drinks per day or >7 drinks per week? No    Alcohol Use 10/11/2022   Prescreen: >3 drinks/day or >7 drinks/week? -   Prescreen: >3 drinks/day or >7 drinks/week? No       Reviewed orders with patient.  Reviewed health maintenance and updated orders accordingly - Yes      Breast Cancer Screening:    FHS-7:   Breast CA Risk Assessment (FHS-7) 10/4/2022   Did any of your first-degree relatives have breast or ovarian cancer? No   Did any of your relatives have bilateral breast cancer? No   Did any man in your family have breast cancer? No   Did any woman in your family have breast and ovarian cancer? Yes   Did any woman in your family have breast cancer before age 50 y? Yes   Do you have 2 or more relatives with breast and/or ovarian cancer? Yes   Do you have 2 or more relatives with breast and/or bowel cancer? Yes       Patient under 40 years of age: Routine Mammogram Screening not recommended.   Pertinent mammograms are reviewed under the imaging tab.    History of abnormal Pap smear: NO - age 21-29 PAP every 3 years recommended     Reviewed and updated as needed this visit by clinical staff   Tobacco  Allergies  Meds  Problems  Med Hx  Surg Hx  Fam Hx  Soc   Hx        Reviewed and updated as needed this visit by Provider   Tobacco  Allergies  Meds  Problems  Med Hx  Surg Hx  Fam Hx             Review of Systems   Constitutional: Negative for chills and fever.   HENT: Positive for congestion. Negative for ear pain, hearing loss and sore throat.    Eyes: Negative for pain and visual disturbance.   Respiratory: Negative for cough and shortness of breath.    Cardiovascular:  "Negative for chest pain, palpitations and peripheral edema.   Gastrointestinal: Positive for abdominal pain, heartburn and nausea. Negative for constipation, diarrhea and hematochezia.   Breasts:  Negative for tenderness, breast mass and discharge.   Genitourinary: Positive for frequency, pelvic pain and vaginal discharge. Negative for dysuria, genital sores, hematuria, urgency and vaginal bleeding.   Musculoskeletal: Positive for arthralgias and myalgias. Negative for joint swelling.   Skin: Negative for rash.   Neurological: Positive for headaches. Negative for dizziness, weakness and paresthesias.   Psychiatric/Behavioral: Negative for mood changes. The patient is not nervous/anxious.           OBJECTIVE:   /68   Pulse 96   Ht 1.708 m (5' 7.25\")   Wt 64.5 kg (142 lb 4.8 oz)   LMP 10/10/2022   SpO2 100%   Breastfeeding No   BMI 22.12 kg/m    Physical Exam  GENERAL: healthy, alert and no distress  EYES: Eyes grossly normal to inspection, PERRL and conjunctivae and sclerae normal  HENT: ear canals and TM's normal, nose and mouth without ulcers or lesions  NECK: no adenopathy, no asymmetry, masses, or scars and thyroid normal to palpation  RESP: lungs clear to auscultation - no rales, rhonchi or wheezes  BREAST: normal without masses, tenderness or nipple discharge and no palpable axillary masses or adenopathy  CV: regular rate and rhythm, normal S1 S2, no S3 or S4, no murmur, click or rub, no peripheral edema and peripheral pulses strong  ABDOMEN: soft, nontender, no hepatosplenomegaly, no masses and bowel sounds normal   (female): deferred  MS: no gross musculoskeletal defects noted, no edema  SKIN: no suspicious lesions or rashes  NEURO: some increased tone bilaterally, mentation intact and speech normal  PSYCH: mentation appears normal, affect normal/bright        ASSESSMENT/PLAN:   Anjelica was seen today for physical.    Diagnoses and all orders for this visit:    Encounter for routine history and " physical examination of adult   Routine health maintenance discussion:  No smoking, limited alcohol (7 or less servings per week), 5 fruits/veg servings per day, 200 minutes of exercise per week.  Daily calcium/vitamin D guidelines, bone health, colon cancer screening beginning at age 50.  Accident avoidance, sun screen.    Attention deficit hyperactivity disorder (ADHD), unspecified ADHD type  -     amphetamine-dextroamphetamine (ADDERALL XR) 20 MG 24 hr capsule; Take 1 capsule (20 mg) by mouth every morning   Doing well with current meds. Will update CSA and UDS today, f/u in six months virtually if continuing to do well.     Intractable migraine with aura with status migrainosus  -     rizatriptan (MAXALT) 5 MG tablet; Take 1 tablet (5 mg) by mouth at onset of headache for migraine May repeat in 2 hours. Max 6 tablets/24 hours.   Will have her trial Maxalt rather than the imitrex to see if this helps better. She will let me know if not and we can consider alternative options.   Will continue her OCP for the next few months, if not improvin she'll reach out and we can try an alternate.     Irregular bowel habits   Discussed that this is likely IBS, Will add in fiber and if not improving she will let me know and I can refer her to GI    Acute bronchospasm  -     albuterol (PROAIR HFA/PROVENTIL HFA/VENTOLIN HFA) 108 (90 Base) MCG/ACT inhaler; Inhale 2 puffs into the lungs every 6 hours as needed for shortness of breath / dyspnea or wheezing   Issues following covid, will renew today and if she is finding that things are not continuing to improve we can consider a daily controlled inhaler    Cervical cancer screening   Discussed r/b of not screening. Has never been sexually active, will defer for now    Immunization due  -     INFLUENZA VACCINE IM > 6 MONTHS VALENT IIV4 (AFLURIA/FLUZONE)  -     Cancel: COVID-19,PF,PFIZER (12+ Yrs GRAY LABEL)    Encounter for therapeutic drug monitoring  -     Urine Drugs of Abuse  "Screen Panel 1+ - Drug Screen plus Methadone; Future  -     Urine Drugs of Abuse Screen Panel 1+ - Drug Screen plus Methadone    Other orders  -     REVIEW OF HEALTH MAINTENANCE PROTOCOL ORDERS  -     COVID-19,PF,PFIZER BOOSTER BIVALENT        Patient has been advised of split billing requirements and indicates understanding: Yes    COUNSELING:  Reviewed preventive health counseling, as reflected in patient instructions       Regular exercise       Healthy diet/nutrition    Estimated body mass index is 22.12 kg/m  as calculated from the following:    Height as of this encounter: 1.708 m (5' 7.25\").    Weight as of this encounter: 64.5 kg (142 lb 4.8 oz).        She reports that she has never smoked. She has never used smokeless tobacco.      Counseling Resources:  ATP IV Guidelines  Pooled Cohorts Equation Calculator  Breast Cancer Risk Calculator  BRCA-Related Cancer Risk Assessment: FHS-7 Tool  FRAX Risk Assessment  ICSI Preventive Guidelines  Dietary Guidelines for Americans, 2010  USDA's MyPlate  ASA Prophylaxis  Lung CA Screening    Lacey Gabriel MD  [facility]  "

## 2022-10-11 NOTE — LETTER
[facility] BEBETO [place]o  10/11/22  Patient: Anjelica Ramirez  YOB: 1995  Medical Record Number: [id number]                                                                                  Non-Opioid Controlled Substance Agreement    This is an agreement between you and your provider regarding safe and appropriate use of controlled substances prescribed by your care team. Controlled substances are?medicines that can cause physical and mental dependence (abuse).     There are strict laws about having and using these medicines. We here at [facility] are  committed to working with you in your efforts to get better. To support you in this work, we'll help you schedule regular office appointments for medicine refills. If we must cancel or change your appointment for any reason, we'll make sure you have enough medicine to last until your next appointment.     As a Provider, I will:     Listen carefully to your concerns while treating you with respect.     Recommend a treatment plan that I believe is in your best interest and may involve therapies other than medicine.      Talk with you often about the possible benefits and the risk of harm of any medicine that we prescribe for you.    Assess the safety of this medicine and check how well it works.      Provide a plan on how to taper (discontinue or go off) using this medicine if the decision is made to stop its use.      ::  As a Patient, I understand controlled substances:       Are prescribed by my care provider to help me function or work and manage my condition(s).?    Are strong medicines and can cause serious side effects.       Need to be taken exactly as prescribed.?Combining controlled substances with certain medicines or chemicals (such as illegal drugs, alcohol, sedatives, sleeping pills, and benzodiazepines) can be dangerous or even fatal.? If I stop taking my medicines suddenly, I may have severe withdrawal symptoms.     The  risks, benefits, and side effects of these medicine(s) were explained to me. I agree that:    1. I will take part in other treatments as advised by my care team. This may be psychiatry or counseling, physical therapy, behavioral therapy, group treatment or a referral to specialist.    2. I will keep all my appointments and understand this is part of the monitoring of controlled substances.?My care team may require an office visit for EVERY controlled substance refill. If I miss appointments or don t follow instructions, my care team may stop my medicine    3. I will take my medicines as prescribed. I will not change the dose or schedule unless my care team tells me to. There will be no refills if I run out early.      4. I may be asked to come to the clinic and complete a urine drug test or complete a pill count. If I don t give a urine sample or participate in a pill count, the care team may stop my medicine.    5. I will only receive controlled substance prescriptions from this clinic. If I am treated by another provider, I will tell them that I am taking controlled substances and that I have a treatment agreement with this provider. I will inform my [facility] care team within one business day if I am given a prescription for any controlled substance by another healthcare provider. My [facility] care team can contact other providers and pharmacists about my use of any medicines.    6. It is up to me to make sure that I don't run out of my medicines on weekends or holidays.?If my care team is willing to refill my prescription without a visit, I must request refills only during office hours. Refills may take up to 3 business days to process. I will use one pharmacy to fill all my controlled substance prescriptions. I will notify the clinic about any changes to my insurance or medicine availability.    7. I am responsible for my prescriptions. If the medicine/prescription is lost, stolen or destroyed,  it will not be replaced.?I also agree not to share controlled substance medicines with anyone.     8. I am aware I should not use any illegal or recreational drugs. I agree not to drink alcohol unless my care team says I can.     9. If I enroll in the [place] Medical Cannabis program, I will tell my care team before my next refill.    10. I will tell my care team right away if I become pregnant, have a new medical problem treated outside of my regular clinic, or have a change in my medicines.     11. I understand that this medicine can affect my thinking, judgment and reaction time.? Alcohol and drugs affect the brain and body, which can affect the safety of my driving. Being under the influence of alcohol or drugs can affect my decision-making, behaviors, personal safety and the safety of others. Driving while impaired (DWI) can occur if a person is driving, operating or in physical control of a car, motorcycle, boat, snowmobile, ATV, motorbike, off-road vehicle or any other motor vehicle ([place] Statute 169A.20). I understand the risk if I choose to drive or operate any vehicle or machinery.    I understand that if I do not follow any of the conditions above, my prescriptions or treatment may be stopped or changed.   I agree that my provider, clinic care team and pharmacy may work with any city, state or federal law enforcement agency that investigates the misuse, sale or other diversion of my controlled medicine. I will allow my provider to discuss my care with, or share a copy of, this agreement with any other treating provider, pharmacy or emergency room where I receive care.     I have read this agreement and have asked questions about anything I did not understand.    ________________________________________________________  Patient Signature - Anjelica Ramirez     ___________________                   Date     ________________________________________________________  Provider Signature - Lacey Gabriel MD        ___________________                   Date     ________________________________________________________  Witness Signature (required if provider not present while patient signing)          ___________________                   Date

## 2022-10-12 LAB
AMPHETAMINES UR QL SCN: NORMAL
BARBITURATES UR QL SCN: NORMAL
BENZODIAZ UR QL SCN: NORMAL
BZE UR QL SCN: NORMAL
CANNABINOIDS UR QL SCN: NORMAL
CREAT UR-MCNC: 58.9 MG/DL
METHADONE UR QL SCN: NORMAL
OPIATES UR QL SCN: NORMAL
OXYCODONE UR QL: NORMAL
PCP QUAL URINE (ROCHE): NORMAL

## 2022-11-23 DIAGNOSIS — F90.9 ATTENTION DEFICIT HYPERACTIVITY DISORDER (ADHD), UNSPECIFIED ADHD TYPE: ICD-10-CM

## 2022-11-23 RX ORDER — DEXTROAMPHETAMINE SACCHARATE, AMPHETAMINE ASPARTATE MONOHYDRATE, DEXTROAMPHETAMINE SULFATE AND AMPHETAMINE SULFATE 5; 5; 5; 5 MG/1; MG/1; MG/1; MG/1
20 CAPSULE, EXTENDED RELEASE ORAL EVERY MORNING
Qty: 30 CAPSULE | Refills: 0 | Status: SHIPPED | OUTPATIENT
Start: 2022-11-23 | End: 2022-12-14

## 2022-11-23 NOTE — TELEPHONE ENCOUNTER
Medication Question or Refill    Contacts       Type Contact Phone/Fax    11/23/2022 09:12 AM CST Phone (Incoming) Anjelica Ramirez (Self) [phone] (M)          What medication are you calling about (include dose and sig)?: adderall 20mg     Controlled Substance Agreement on file:   CSA -- Patient Level:     [Media Unavailable] Controlled Substance Agreement - Non - Opioid - Scan on 8/14/2021  8:48 PM: NON-OPIOID CONTROLLED SUBSTANCE AGREEMENT   [Media Unavailable] Controlled Substance Agreement - Non - Opioid - Scan on 3/13/2020: NON-OPIOID CONTROLLED SUBSTANCE AGREEMENT   [Media Unavailable] Controlled Substance Agreement - Non - Opioid - Scan on 5/16/2019   [Media Unavailable] Controlled Substance Agreement - Opioid - Scan on 8/21/2018: ADHD       Who prescribed the medication?: Gabriel     Do you need a refill? Yes: will be out in 2 weeks. Local pharmacy does not have in stock. Will take a week for it to be shipped out due to holiday.    When did you use the medication last? Today     Patient offered an appointment? No    Do you have any questions or concerns?  No     Preferred Pharmacy:     [facility] - [address] [address]  Phone: [phone] Fax: [phone]      Could we send this information to you in [employer] or would you prefer to receive a phone call?:   Patient would prefer a phone call   Okay to leave a detailed message?: Yes at Cell number on file:    Telephone Information:   Mobile [phone]

## 2022-11-29 ENCOUNTER — TELEPHONE (OUTPATIENT)
Dept: FAMILY MEDICINE | Facility: CLINIC | Age: 27
End: 2022-11-29

## 2022-11-29 NOTE — TELEPHONE ENCOUNTER
FYI - Status Update    Who is Calling: patient    Update: Patient was prescribed a blood pressure medication from Dermatology provider. Patient does not know what medication was prescribed. Will  at pharmacy and send [employer] message to Dr. Gabriel. Patient does not recall what her blood pressure readings were at Dermatology either. Patient does have family history of hypertension.     Does caller want a call/response back: No

## 2023-01-05 DIAGNOSIS — K21.00 GASTROESOPHAGEAL REFLUX DISEASE WITH ESOPHAGITIS WITHOUT HEMORRHAGE: ICD-10-CM

## 2023-01-06 RX ORDER — FAMOTIDINE 40 MG/1
TABLET, FILM COATED ORAL
Qty: 90 TABLET | Refills: 2 | Status: SHIPPED | OUTPATIENT
Start: 2023-01-06 | End: 2023-10-19

## 2023-01-06 NOTE — TELEPHONE ENCOUNTER
"Last Written Prescription Date:  7/11/22  Last Fill Quantity: 90,  # refills: 1   Last office visit provider:  10/11/22     Requested Prescriptions   Pending Prescriptions Disp Refills     famotidine (PEPCID) 40 MG tablet [Pharmacy Med Name: FAMOTIDINE TABS 40MG] 90 tablet 3     Sig: TAKE 1 TABLET EVERY EVENING       H2 Blockers Protocol Passed - 1/6/2023  2:15 PM        Passed - Patient is age 12 or older        Passed - Recent (12 mo) or future (30 days) visit within the authorizing provider's specialty     Patient has had an office visit with the authorizing provider or a provider within the authorizing providers department within the previous 12 mos or has a future within next 30 days. See \"Patient Info\" tab in inbasket, or \"Choose Columns\" in Meds & Orders section of the refill encounter.              Passed - Medication is active on med list             Lacey Metz RN 01/06/23 2:15 PM  "

## 2023-02-09 DIAGNOSIS — L70.9 ACNE: ICD-10-CM

## 2023-02-09 NOTE — TELEPHONE ENCOUNTER
Refill Request  Medication name: Pending Prescriptions:                       Disp   Refills    norgestimate-ethinyl estradiol (ESTARYLLA*84 tab*1            Sig: Take 1 tablet by mouth daily    Requested Pharmacy: [facility]

## 2023-02-10 RX ORDER — NORGESTIMATE AND ETHINYL ESTRADIOL 0.25-0.035
1 KIT ORAL DAILY
Qty: 84 TABLET | Refills: 2 | Status: SHIPPED | OUTPATIENT
Start: 2023-02-10 | End: 2023-10-19

## 2023-02-10 NOTE — TELEPHONE ENCOUNTER
"Last Written Prescription Date:  8/25/22  Last Fill Quantity: 84,  # refills: 1   Last office visit provider:  10/11/22     Requested Prescriptions   Pending Prescriptions Disp Refills     norgestimate-ethinyl estradiol (ESTARYLLA) 0.25-35 MG-MCG tablet 84 tablet 1     Sig: Take 1 tablet by mouth daily       Contraceptives Protocol Passed - 2/10/2023  2:44 PM        Passed - Patient is not a current smoker if age is 35 or older        Passed - Recent (12 mo) or future (30 days) visit within the authorizing provider's specialty     Patient has had an office visit with the authorizing provider or a provider within the authorizing providers department within the previous 12 mos or has a future within next 30 days. See \"Patient Info\" tab in inbasket, or \"Choose Columns\" in Meds & Orders section of the refill encounter.              Passed - Medication is active on med list        Passed - No active pregnancy on record        Passed - No positive pregnancy test in past 12 months             Daniel Ayers RN 02/10/23 2:44 PM  "

## 2023-02-16 ENCOUNTER — NURSE TRIAGE (OUTPATIENT)
Dept: NURSING | Facility: CLINIC | Age: 28
End: 2023-02-16
Payer: COMMERCIAL

## 2023-02-16 NOTE — TELEPHONE ENCOUNTER
Patient calling reports waking up with heart rate of 130. Patient reports having light headedness and dizziness as well. Denies passing out, pale skin and confusion. Advised per protocol to be seen in the ER with patient agreeable to the plan.     Carlee Schmitz RN 02/16/23 1:07 AM   [facility] Triage Nurse Advisor      Reason for Disposition    Dizziness, lightheadedness, or weakness    Additional Information    Negative: Passed out (i.e., lost consciousness, collapsed and was not responding)    Negative: Shock suspected (e.g., cold/pale/clammy skin, too weak to stand, low BP, rapid pulse)    Negative: Difficult to awaken or acting confused (e.g., disoriented, slurred speech)    Negative: Visible sweat on face or sweat dripping down face    Negative: Unable to walk, or can only walk with assistance (e.g., requires support)    Negative: [1] Received SHOCK from implantable cardiac defibrillator AND [2] persisting symptoms (i.e., palpitations, lightheadedness)    Negative: [1] Dizziness, lightheadedness, or weakness AND [2] heart beating very rapidly (e.g., > 140 / minute)    Negative: [1] Dizziness, lightheadedness, or weakness AND [2] heart beating very slowly (e.g., < 50 / minute)    Negative: Sounds like a life-threatening emergency to the triager    Negative: Chest pain    Negative: Implantable Cardiac Defibrillator (ICD) or a pacemaker symptoms or questions    Negative: Difficulty breathing    Protocols used: HEART RATE AND HEARTBEAT [id number]

## 2023-03-20 ENCOUNTER — MYC REFILL (OUTPATIENT)
Dept: FAMILY MEDICINE | Facility: CLINIC | Age: 28
End: 2023-03-20
Payer: COMMERCIAL

## 2023-03-20 DIAGNOSIS — F90.9 ATTENTION DEFICIT HYPERACTIVITY DISORDER (ADHD), UNSPECIFIED ADHD TYPE: ICD-10-CM

## 2023-03-21 RX ORDER — DEXTROAMPHETAMINE SACCHARATE, AMPHETAMINE ASPARTATE MONOHYDRATE, DEXTROAMPHETAMINE SULFATE AND AMPHETAMINE SULFATE 5; 5; 5; 5 MG/1; MG/1; MG/1; MG/1
20 CAPSULE, EXTENDED RELEASE ORAL EVERY MORNING
Qty: 90 CAPSULE | Refills: 0 | Status: SHIPPED | OUTPATIENT
Start: 2023-03-21 | End: 2023-04-15

## 2023-03-29 ENCOUNTER — TELEPHONE (OUTPATIENT)
Dept: FAMILY MEDICINE | Facility: CLINIC | Age: 28
End: 2023-03-29

## 2023-03-29 NOTE — TELEPHONE ENCOUNTER
Received fax from [employer] that they are unable to fill Amphetamine mixed ER caps due to product unavailable

## 2023-04-07 ENCOUNTER — TRANSFERRED RECORDS (OUTPATIENT)
Dept: HEALTH INFORMATION MANAGEMENT | Facility: CLINIC | Age: 28
End: 2023-04-07

## 2023-04-21 ENCOUNTER — TELEPHONE (OUTPATIENT)
Dept: FAMILY MEDICINE | Facility: CLINIC | Age: 28
End: 2023-04-21
Payer: COMMERCIAL

## 2023-04-21 NOTE — TELEPHONE ENCOUNTER
She has received the series, so she hopefully wouldn't need it. Did they do lab work to see if she was immune to it?     It's a new general recommendation for all adults which is probably why they are recommending it, but like I mentioned, she had it already.

## 2023-04-21 NOTE — TELEPHONE ENCOUNTER
General Call    Contacts       Type Contact Phone/Fax    04/21/2023 09:51 AM CDT Phone (Incoming) Anjelica Ramirez (Self) [phone] (M)        Reason for Call:  Vaccine questions    What are your questions or concerns:  Pt states her job is telling her she should get updated Hep B, and she would like to know what we recommend.     Please advise.    Could we send this information to you in [employer] or would you prefer to receive a phone call?:   Patient would prefer a phone call   Okay to leave a detailed message?: Yes at Cell number on file:    Telephone Information:   Mobile [phone]

## 2023-04-27 ENCOUNTER — OFFICE VISIT (OUTPATIENT)
Dept: FAMILY MEDICINE | Facility: CLINIC | Age: 28
End: 2023-04-27
Payer: COMMERCIAL

## 2023-04-27 VITALS
HEIGHT: 68 IN | DIASTOLIC BLOOD PRESSURE: 64 MMHG | BODY MASS INDEX: 21.22 KG/M2 | OXYGEN SATURATION: 99 % | WEIGHT: 140 LBS | SYSTOLIC BLOOD PRESSURE: 112 MMHG | TEMPERATURE: 98.3 F | HEART RATE: 85 BPM | RESPIRATION RATE: 16 BRPM

## 2023-04-27 DIAGNOSIS — F90.9 ATTENTION DEFICIT HYPERACTIVITY DISORDER (ADHD), UNSPECIFIED ADHD TYPE: Primary | ICD-10-CM

## 2023-04-27 DIAGNOSIS — R10.32 LLQ ABDOMINAL PAIN: ICD-10-CM

## 2023-04-27 DIAGNOSIS — I47.10 SVT (SUPRAVENTRICULAR TACHYCARDIA) (H): ICD-10-CM

## 2023-04-27 PROCEDURE — 99214 OFFICE O/P EST MOD 30 MIN: CPT | Performed by: PHYSICIAN ASSISTANT

## 2023-04-27 RX ORDER — DEXTROAMPHETAMINE SACCHARATE, AMPHETAMINE ASPARTATE MONOHYDRATE, DEXTROAMPHETAMINE SULFATE AND AMPHETAMINE SULFATE 5; 5; 5; 5 MG/1; MG/1; MG/1; MG/1
20 CAPSULE, EXTENDED RELEASE ORAL DAILY
Qty: 30 CAPSULE | Refills: 0 | Status: SHIPPED | OUTPATIENT
Start: 2023-07-16 | End: 2023-08-15

## 2023-04-27 RX ORDER — DEXTROAMPHETAMINE SACCHARATE, AMPHETAMINE ASPARTATE MONOHYDRATE, DEXTROAMPHETAMINE SULFATE AND AMPHETAMINE SULFATE 5; 5; 5; 5 MG/1; MG/1; MG/1; MG/1
20 CAPSULE, EXTENDED RELEASE ORAL DAILY
Qty: 30 CAPSULE | Refills: 0 | Status: SHIPPED | OUTPATIENT
Start: 2023-06-16 | End: 2023-07-16

## 2023-04-27 RX ORDER — DEXTROAMPHETAMINE SACCHARATE, AMPHETAMINE ASPARTATE MONOHYDRATE, DEXTROAMPHETAMINE SULFATE AND AMPHETAMINE SULFATE 5; 5; 5; 5 MG/1; MG/1; MG/1; MG/1
20 CAPSULE, EXTENDED RELEASE ORAL DAILY
Qty: 30 CAPSULE | Refills: 0 | Status: SHIPPED | OUTPATIENT
Start: 2023-05-17 | End: 2023-06-16

## 2023-04-27 RX ORDER — BACLOFEN 10 MG/1
5 TABLET ORAL DAILY
COMMUNITY
Start: 2023-04-10

## 2023-04-27 ASSESSMENT — PAIN SCALES - GENERAL: PAINLEVEL: MILD PAIN (2)

## 2023-04-27 NOTE — PATIENT INSTRUCTIONS
See cardiologist in follow up  If any more SVT would want you to establish with psychiatry for med management.   Avoid caffeine    Constipation  Goal is at least 1 soft bowel movement daily (toothpaste consistency).   - Drink plenty of liquids, especially water.   - Eat plenty of fruits and vegetables. Fruits such as peaches, plums, pears, oranges and   prunes/prune juice can help.   - Continue a daily fiber supplement (metamucil, benefiber).   If fiber alone has not been helpful. Then consider trying Miralax 1 capful dissolved in water or juice 1-2x per day.

## 2023-04-27 NOTE — PROGRESS NOTES
"  Assessment & Plan     Attention deficit hyperactivity disorder (ADHD), unspecified ADHD type  SVT (supraventricular tachycardia) (H)  Reviewed chart from prior PCP and PDMP.  UDS UTD from 10-  Reviewed cardiology notes from  04-.   \"Given the significant benefit that she is derived with Adderall, reasonable to continue taking this for now unless she has more frequent episodes of SVT in the future.\"  If she were to have recurrent SVT , would refer to psychiatry for further prescribing.  She will continue with her cardiologist.  Did refill x 3mo. She was given printed rxs.  Stable long term on current regimen.   PDMP queried   Refilled current med regimen.  Will fill rx when due in 3mo (if no more SVT episdoses) without visit  Next visit: virtual or in person in 6months   Annual UDS - due Oct 2023      - amphetamine-dextroamphetamine (ADDERALL XR) 20 MG 24 hr capsule; Take 1 capsule (20 mg) by mouth daily for 30 days  - amphetamine-dextroamphetamine (ADDERALL XR) 20 MG 24 hr capsule; Take 1 capsule (20 mg) by mouth daily for 30 days  - amphetamine-dextroamphetamine (ADDERALL XR) 20 MG 24 hr capsule; Take 1 capsule (20 mg) by mouth daily for 30 days      LLQ abdominal pain  Onset prior to need for BM, alleviated after BM  Episodic  No fevers  Discussed constipation management and stool softening strategies   If worsening pain, bleeding or diarrhea (IBD s/sx) follow up       Follow Up: The patient was instructed to contact clinic for worsening symptoms, non-improvement in time frame as expected/discussed, and for questions regarding medications or treatment plan. For virtual visits, the patient was advised to be seen for in person evaluation if symptoms or condition are worsening or non-improvement as expected.       Amara Desai PA-C  [facility] DO Dejesus is a 27 year old, presenting for the following health issues:  A.D.H.D, Derm Problem, and Abdominal " "Pain        4/27/2023     8:16 AM   Additional Questions   Roomed by VE   Accompanied by SELF         4/27/2023     8:16 AM   Patient Reported Additional Medications   Patient reports taking the following new medications Baclofen 10mg tab- 1/2 tablet daily for CP. From [facility]- Dr.Perez ESTRADA    History of Present Illness       Reason for visit:  Med check    She eats 0-1 servings of fruits and vegetables daily.She consumes 0 sweetened beverage(s) daily.She exercises with enough effort to increase her heart rate 20 to 29 minutes per day.  She exercises with enough effort to increase her heart rate 7 days per week. She is missing 2 dose(s) of medications per week.  She is not taking prescribed medications regularly due to remembering to take and other.     \"I am due for a 6month med check on my adderall.\"  Medication Followup of Adderall-  in [place] was PCP but moved to [facility] and couldn't see PCP for a few months.  Has been on adderall since middle school years.   Has been stable on current regimen of  adderall XR 20mg daily for years.  Reports going through many other options with side effects prior to this.   Some weeks feels needs more than others- attributes to hormonal pattern.  Taking inconsistently  She does have new insurance starting in august and unsure if [facility] will be covered     Had SVT episode Feb 2023- with heart rate at 200 bpm. Was woken from sleep with racing heart sx. Went to ER and requiring adenosine. She attributes to taking a different generic of adderall. In ER told her to stop her adderall. Saw cardiology- they did echo which she reports was normal.     Tried without adderall for a month and did not do well. \"I cannot function well.\"  Cardiologist thought could continue on adderall with observation and if recurrent SVT episode, then revisit.  They talked about possible beta blocker also, not prescribed yet.  Seeing cardiology back in 3 " "months      Taking Medication as prescribed: yes    Side Effects:  Previous SVT from medication    Medication Helping Symptoms:  yes      Pain History:  When did you first notice your pain? July 2022 (9mo) - on and off. LLQ - has pain, then has urge to have BM and pain gets better after a BM. Taking fiber. No bleeding. No diarrhea.   Have you seen anyone else for your pain? Yes - primary   How has your pain affected your ability to work? Not applicable  Where in your body do you have pain? Abdominal/Flank Pain  Onset/Duration: July  Description:   Character: Sharp  Location: left lower quadrant  Radiation: None  Intensity: 2/10  Progression of Symptoms:  intermittent  Accompanying Signs & Symptoms:  Fever/Chills: No  Gas/Bloating: YES, occasional gas  Nausea: YES  Vomitting: No  Diarrhea: YES  Constipation: No  Dysuria or Hematuria: No  History:   Trauma: No  Previous similar pain: YES  Previous tests done: none recently  Precipitating factors:   Does the pain change with:     Food: No    Bowel Movement: YES    Urination: No   Other factors:  On hernia area  Therapies tried and outcome: fiber supplement, nexium    Patient's last menstrual period was 04/02/2023 (approximate).        Review of Systems   Constitutional, HEENT, cardiovascular, pulmonary, gi and gu systems are negative, except as otherwise noted.      Objective    /64 (BP Location: Left arm, Patient Position: Chair, Cuff Size: Adult Regular)   Pulse 85   Temp 98.3  F (36.8  C) (Temporal)   Resp 16   Ht 1.727 m (5' 8\")   Wt 63.5 kg (140 lb)   LMP 04/02/2023 (Approximate)   SpO2 99%   Breastfeeding No   BMI 21.29 kg/m    Body mass index is 21.29 kg/m .  Physical Exam   GENERAL: healthy, alert and no distress  EYES: Eyes grossly normal to inspection, PERRL and conjunctivae and sclerae normal  HENT: ear canals and TM's normal, nose and mouth without ulcers or lesions  NECK: no adenopathy, no asymmetry, masses, or scars and thyroid normal to " palpation  RESP: lungs clear to auscultation - no rales, rhonchi or wheezes  CV: regular rate and rhythm, normal S1 S2, no S3 or S4, no murmur, click or rub, no peripheral edema and peripheral pulses strong  ABDOMEN: soft, nontender, no hepatosplenomegaly, no masses and bowel sounds normal  MS: no gross musculoskeletal defects noted, no edema  NEURO: Normal strength and tone, mentation intact and speech normal  PSYCH: mentation appears normal, affect normal , mildlyflat. No SI/HI, neatly groomed, casually dressed.

## 2023-08-18 DIAGNOSIS — F90.9 ATTENTION DEFICIT HYPERACTIVITY DISORDER (ADHD), UNSPECIFIED ADHD TYPE: ICD-10-CM

## 2023-08-18 NOTE — TELEPHONE ENCOUNTER
Refill Request  Medication name: Pending Prescriptions:                       Disp   Refills    amphetamine-dextroamphetamine (ADDERALL X*30 cap*0            Sig: Take 1 capsule (20 mg) by mouth every morning    Requested Pharmacy:     [facility] - [address] [id number] - [place], [place] - [address] [place] [address].       Please try to send [facility] days [facility], if unable to do 90 days, please send 30 day supply to [facility] in [facility]

## 2023-08-21 RX ORDER — DEXTROAMPHETAMINE SACCHARATE, AMPHETAMINE ASPARTATE MONOHYDRATE, DEXTROAMPHETAMINE SULFATE AND AMPHETAMINE SULFATE 5; 5; 5; 5 MG/1; MG/1; MG/1; MG/1
20 CAPSULE, EXTENDED RELEASE ORAL EVERY MORNING
Qty: 90 CAPSULE | Refills: 0 | Status: SHIPPED | OUTPATIENT
Start: 2023-08-21 | End: 2023-08-30

## 2023-08-21 NOTE — TELEPHONE ENCOUNTER
Refill sent for her, she is due to see me in October for a medication check. Ok for virtual if she is in [place].

## 2023-08-24 DIAGNOSIS — G80.9 CEREBRAL PALSY, UNSPECIFIED TYPE (H): ICD-10-CM

## 2023-08-24 DIAGNOSIS — F90.9 ATTENTION DEFICIT HYPERACTIVITY DISORDER (ADHD), UNSPECIFIED ADHD TYPE: ICD-10-CM

## 2023-08-24 NOTE — TELEPHONE ENCOUNTER
Refill Request  Medication name: Pending Prescriptions:                       Disp   Refills    ibuprofen (ADVIL/MOTRIN) 600 MG tablet    60 tab*5          Requested Pharmacy:  [facility] - [address]

## 2023-08-24 NOTE — TELEPHONE ENCOUNTER
"Routing refill request to provider for review/approval because:  Labs not current:  ALT, AST, CBC, CR    Last Written Prescription Date:  08/29/2022  Last Fill Quantity: 60,  # refills: 5   Last office visit provider:  10/11/2022     Requested Prescriptions   Pending Prescriptions Disp Refills    ibuprofen (ADVIL/MOTRIN) 600 MG tablet 60 tablet 5       NSAID Medications Failed - 8/24/2023  1:58 PM        Failed - Normal ALT on file in past 12 months     Recent Labs   Lab Test 03/10/20  1126   ALT 25             Failed - Normal AST on file in past 12 months     Recent Labs   Lab Test 03/10/20  1126   AST 18             Failed - Normal CBC on file in past 12 months     Recent Labs   Lab Test 08/02/21  1046 03/10/20  1126   WBC  --  4.8   RBC  --  4.61   HGB 12.5 13.8   HCT  --  41.5   PLT  --  302                 Failed - Normal serum creatinine on file in past 12 months     Recent Labs   Lab Test 08/02/21  1046   CR 0.69       Ok to refill medication if creatinine is low          Passed - Blood pressure under 140/90 in past 12 months     BP Readings from Last 3 Encounters:   04/27/23 112/64   10/11/22 116/68   08/02/21 100/68                 Passed - Recent (12 mo) or future (30 days) visit within the authorizing provider's specialty     Patient has had an office visit with the authorizing provider or a provider within the authorizing providers department within the previous 12 mos or has a future within next 30 days. See \"Patient Info\" tab in inbasket, or \"Choose Columns\" in Meds & Orders section of the refill encounter.              Passed - Patient is age 6-64 years        Passed - Medication is active on med list        Passed - No active pregnancy on record        Passed - No positive pregnancy test in past 12 months             Ivonne Randall RN 08/24/23 2:03 PM  "

## 2023-08-25 RX ORDER — IBUPROFEN 600 MG/1
TABLET, FILM COATED ORAL
Qty: 60 TABLET | Refills: 5 | Status: SHIPPED | OUTPATIENT
Start: 2023-08-25

## 2023-08-29 RX ORDER — DEXTROAMPHETAMINE SACCHARATE, AMPHETAMINE ASPARTATE MONOHYDRATE, DEXTROAMPHETAMINE SULFATE AND AMPHETAMINE SULFATE 5; 5; 5; 5 MG/1; MG/1; MG/1; MG/1
20 CAPSULE, EXTENDED RELEASE ORAL EVERY MORNING
Qty: 90 CAPSULE | Refills: 0 | Status: CANCELLED | OUTPATIENT
Start: 2023-08-29

## 2023-08-29 NOTE — TELEPHONE ENCOUNTER
Per pt, [facility] is out of stock, needs sent to [facility] on [address]  Please call once sent.

## 2023-08-30 DIAGNOSIS — F90.9 ATTENTION DEFICIT HYPERACTIVITY DISORDER (ADHD), UNSPECIFIED ADHD TYPE: ICD-10-CM

## 2023-08-30 RX ORDER — DEXTROAMPHETAMINE SACCHARATE, AMPHETAMINE ASPARTATE MONOHYDRATE, DEXTROAMPHETAMINE SULFATE AND AMPHETAMINE SULFATE 5; 5; 5; 5 MG/1; MG/1; MG/1; MG/1
20 CAPSULE, EXTENDED RELEASE ORAL EVERY MORNING
Qty: 90 CAPSULE | Refills: 0 | Status: CANCELLED | OUTPATIENT
Start: 2023-08-30

## 2023-08-30 RX ORDER — DEXTROAMPHETAMINE SACCHARATE, AMPHETAMINE ASPARTATE MONOHYDRATE, DEXTROAMPHETAMINE SULFATE AND AMPHETAMINE SULFATE 5; 5; 5; 5 MG/1; MG/1; MG/1; MG/1
20 CAPSULE, EXTENDED RELEASE ORAL EVERY MORNING
Qty: 90 CAPSULE | Refills: 0 | Status: SHIPPED | OUTPATIENT
Start: 2023-08-30

## 2023-08-30 NOTE — TELEPHONE ENCOUNTER
Pt called to check the status of this refill. Her insurance ends at the end of the month so she's hoping to get it filled today or tomorrow. Please advise.    [facility] was unable to fill the previous script. [facility] pharmacy has medication in stock and is able to fill immediately.

## 2023-08-30 NOTE — TELEPHONE ENCOUNTER
Pt called to check the status of this refill. Her insurance ends at the end of the month so she's hoping to get it filled today or tomorrow. Please advise.    [facility] was unable to fill the previous script. [facility] pharmacy has medication in stock and is able to fill immediately.

## 2023-09-29 ENCOUNTER — TELEPHONE (OUTPATIENT)
Dept: FAMILY MEDICINE | Facility: CLINIC | Age: 28
End: 2023-09-29
Payer: COMMERCIAL

## 2023-09-29 NOTE — TELEPHONE ENCOUNTER
Reason for Call:  Appointment Request    Patient requesting this type of appt:  Preventive     Requested provider: Lacey Gabriel    Reason patient unable to be scheduled: Not within requested timeframe    When does patient want to be seen/preferred time: 1-2 weeks    Comments: Patient would like to be see by her provider before the end of the year.    Could we send this information to you in [place] or would you prefer to receive a phone call?:   Patient would prefer a phone call   Okay to leave a detailed message?: Yes at Cell number on file:    Telephone Information:   Mobile [phone]       Call taken on 9/29/2023 at 10:55 AM by Eliud Espinosa

## 2023-10-02 NOTE — TELEPHONE ENCOUNTER
Pt is scheduled for 1/31/23. Pt states that she just moved to [facility]. Wondering if Dr. Gabriel knows or could recommend someone for her to transfer to.

## 2023-10-02 NOTE — TELEPHONE ENCOUNTER
I don't know any of the family medicine doctors at either [facility].     That being said, seeing a family medicine provider should feel very similar to seeing Dr. Oliva and I, I would generally start with a younger, female family medicine provider and that should feel comparable to the care she got with us.

## 2023-10-19 DIAGNOSIS — K21.00 GASTROESOPHAGEAL REFLUX DISEASE WITH ESOPHAGITIS WITHOUT HEMORRHAGE: ICD-10-CM

## 2023-10-19 DIAGNOSIS — L70.9 ACNE: ICD-10-CM

## 2023-10-19 RX ORDER — FAMOTIDINE 40 MG/1
40 TABLET, FILM COATED ORAL EVERY EVENING
Qty: 90 TABLET | Refills: 1 | Status: SHIPPED | OUTPATIENT
Start: 2023-10-19

## 2023-10-19 RX ORDER — NORGESTIMATE AND ETHINYL ESTRADIOL 0.25-0.035
1 KIT ORAL DAILY
Qty: 84 TABLET | Refills: 0 | Status: SHIPPED | OUTPATIENT
Start: 2023-10-19

## 2023-10-19 NOTE — TELEPHONE ENCOUNTER
Refill Request  Medication name: Pending Prescriptions:                       Disp   Refills    famotidine (PEPCID) 40 MG tablet          90 tab*2            Sig: Take 1 tablet (40 mg) by mouth every evening    Requested Pharmacy:  [facility] - [place] [id number] [facility]

## 2023-10-19 NOTE — TELEPHONE ENCOUNTER
Refill Request  Medication name: Pending Prescriptions:                       Disp   Refills    norgestimate-ethinyl estradiol (ESTARYLLA*84 tab*2            Sig: Take 1 tablet by mouth daily    Requested Pharmacy:  [email or web address] - [facility] [address] [facility]

## 2023-12-03 ENCOUNTER — HEALTH MAINTENANCE LETTER (OUTPATIENT)
Age: 28
End: 2023-12-03

## 2024-06-06 DIAGNOSIS — K21.00 GASTROESOPHAGEAL REFLUX DISEASE WITH ESOPHAGITIS WITHOUT HEMORRHAGE: ICD-10-CM

## 2024-06-06 RX ORDER — FAMOTIDINE 40 MG/1
40 TABLET, FILM COATED ORAL EVERY EVENING
Qty: 90 TABLET | Refills: 1 | Status: CANCELLED | OUTPATIENT
Start: 2024-06-06

## 2024-06-06 NOTE — TELEPHONE ENCOUNTER
Please call patient, I thought she was going to establish somewhere when she moved?     I have a refill request for her pepcid, can we forward that somewhere?

## 2024-06-06 NOTE — TELEPHONE ENCOUNTER
Refill Request  Medication name: Pending Prescriptions:                       Disp   Refills    famotidine (PEPCID) 40 MG tablet          90 tab*1            Sig: Take 1 tablet (40 mg) by mouth every evening    Requested Pharmacy:  [facility] - [place] [id number] [facility]

## 2024-09-01 DIAGNOSIS — G80.9 CEREBRAL PALSY, UNSPECIFIED TYPE (H): ICD-10-CM

## 2024-09-03 RX ORDER — IBUPROFEN 600 MG/1
TABLET, FILM COATED ORAL
Qty: 60 TABLET | Refills: 5 | OUTPATIENT
Start: 2024-09-03

## 2025-01-05 ENCOUNTER — HEALTH MAINTENANCE LETTER (OUTPATIENT)
Age: 30
End: 2025-01-05

## 2025-05-23 ENCOUNTER — TRANSFERRED RECORDS (OUTPATIENT)
Dept: ADMINISTRATIVE | Facility: CLINIC | Age: 30
End: 2025-05-23
Payer: COMMERCIAL

## 2025-05-28 NOTE — PROGRESS NOTES
2025        Anjelica Ramirez   [address] [place] [address]-      Anjelica Ramirez,  :  1995    I am writing to let you know the results of the tests that were done the other day.   Thank you for allowing [facility] the opportunity to take part in your healthcare.  At [facility] we strive to provide each patient with the finest gastroenterology care available.  We hope your experience was pleasant and informative.    Your liver MRI returned with a stable mass that is unchanged since your last image.  There are no signs of chronic liver disease in your liver either which is good news.  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 This is good news.     We will continue with plan for monitoring the lesion in 6 months again to ensure it is still stable and has not grown. Please continue to avoid hormonal birth control.    I hope you are feeling well.        Thank you.    Electronically signed by:  Alyson JIANG 2025 07:11 AM  Document generated by:  Alyson JIANG  2025  If your provider ordered multiple tests; the results may not become available at the same time.  If multiple test results are received within 14 days of one another, you may receive a duplicate.  cc:  Argelia Lindsay MD